# Patient Record
Sex: FEMALE | Race: WHITE | Employment: FULL TIME | URBAN - METROPOLITAN AREA
[De-identification: names, ages, dates, MRNs, and addresses within clinical notes are randomized per-mention and may not be internally consistent; named-entity substitution may affect disease eponyms.]

---

## 2017-01-09 ENCOUNTER — PATIENT MESSAGE (OUTPATIENT)
Dept: FAMILY MEDICINE CLINIC | Facility: CLINIC | Age: 42
End: 2017-01-09

## 2017-01-12 NOTE — TELEPHONE ENCOUNTER
From: Renato Cardenas  To: Dinesh Lopez DO  Sent: 1/9/2017 8:54 AM CST  Subject: Non-Urgent Medical Question    Hello,  I received a message about my flu shot. I have received a flu shot from my place of employment on 11/7/2016.     Sanjeev Holman So

## 2017-02-14 RX ORDER — VERAPAMIL HYDROCHLORIDE 240 MG/1
TABLET, FILM COATED, EXTENDED RELEASE ORAL
Qty: 90 TABLET | Refills: 0 | Status: SHIPPED | OUTPATIENT
Start: 2017-02-14 | End: 2017-05-13

## 2017-02-15 ENCOUNTER — TELEPHONE (OUTPATIENT)
Dept: OBGYN CLINIC | Facility: CLINIC | Age: 42
End: 2017-02-15

## 2017-02-15 DIAGNOSIS — Z12.31 SCREENING MAMMOGRAM, ENCOUNTER FOR: Primary | ICD-10-CM

## 2017-02-16 NOTE — TELEPHONE ENCOUNTER
Pt informed of recs below and informed that order for mammo has been placed. Pt given number for central scheduling to call and set up appt. Pt instructed to call office back to [address] [place] [address].  PAAs--when pt calls back, plea

## 2017-02-16 NOTE — TELEPHONE ENCOUNTER
Why is patient on Nova Montoya's schedule for annual when she is my long term patient? Give mammogram order but put her on my schedule -- I had tons of openings recently.  Forward to Cordell to assist.

## 2017-02-16 NOTE — TELEPHONE ENCOUNTER
Pt last Annual 11/2015. Addl mamm views done 1/28/16. Pt had her post op 6/2/16, pt states she had Hysterectomy May 2016.   Pt states that she will schedule her Annual, but wants to know if [facility] can order a mammogram before her Annual.  (See MyChart from 2

## 2017-02-27 ENCOUNTER — OFFICE VISIT (OUTPATIENT)
Dept: FAMILY MEDICINE CLINIC | Facility: CLINIC | Age: 42
End: 2017-02-27

## 2017-02-27 ENCOUNTER — TELEPHONE (OUTPATIENT)
Dept: FAMILY MEDICINE CLINIC | Facility: CLINIC | Age: 42
End: 2017-02-27

## 2017-02-27 VITALS
TEMPERATURE: 98 F | HEART RATE: 85 BPM | DIASTOLIC BLOOD PRESSURE: 84 MMHG | SYSTOLIC BLOOD PRESSURE: 125 MMHG | BODY MASS INDEX: 28 KG/M2 | WEIGHT: 161 LBS

## 2017-02-27 DIAGNOSIS — J01.01 ACUTE RECURRENT MAXILLARY SINUSITIS: ICD-10-CM

## 2017-02-27 DIAGNOSIS — I10 ESSENTIAL HYPERTENSION: ICD-10-CM

## 2017-02-27 DIAGNOSIS — I67.1 BRAIN ANEURYSM: Primary | ICD-10-CM

## 2017-02-27 PROCEDURE — 99212 OFFICE O/P EST SF 10 MIN: CPT | Performed by: FAMILY MEDICINE

## 2017-02-27 PROCEDURE — 99214 OFFICE O/P EST MOD 30 MIN: CPT | Performed by: FAMILY MEDICINE

## 2017-02-27 RX ORDER — AZITHROMYCIN 250 MG/1
TABLET, FILM COATED ORAL
Qty: 6 TABLET | Refills: 0 | Status: SHIPPED | OUTPATIENT
Start: 2017-02-27 | End: 2017-02-28

## 2017-02-27 RX ORDER — VERAPAMIL HYDROCHLORIDE 240 MG/1
240 TABLET, FILM COATED, EXTENDED RELEASE ORAL
Qty: 90 TABLET | Refills: 2 | Status: SHIPPED | OUTPATIENT
Start: 2017-02-27 | End: 2017-12-15

## 2017-02-27 RX ORDER — ALPRAZOLAM 0.5 MG/1
TABLET ORAL
Qty: 30 TABLET | Refills: 6 | Status: SHIPPED
Start: 2017-02-27 | End: 2017-09-13

## 2017-02-27 NOTE — PROGRESS NOTES
HPI:    Patient ID: Laura Williamson is a 36year old female. HPI  Patient presents with:  Hypertension: 3 month f/u  Orders Call: pt will need CTA Brain order      Review of Systems   Constitutional: Negative.     HENT: Positive for congestion and rhin infection at this time. Follow-up brain CT 8 to compared to prior any worsening condition will be referred to neurosurgery otherwise no new symptoms at this time  No orders of the defined types were placed in this encounter.        Meds This Visit:  Signed

## 2017-02-27 NOTE — TELEPHONE ENCOUNTER
Pt said pharmacy did not receive azithromycin 250 MG Oral Tab  Sent to mail order in error  Requesting to resend to [facility]/ [address] [place] [address] today

## 2017-02-28 RX ORDER — AZITHROMYCIN 250 MG/1
TABLET, FILM COATED ORAL
Qty: 6 TABLET | Refills: 0 | Status: SHIPPED | OUTPATIENT
Start: 2017-02-28 | End: 2017-07-13 | Stop reason: ALTCHOICE

## 2017-02-28 NOTE — TELEPHONE ENCOUNTER
From: Kash Feng  To:  Daisy Gonzalez DO  Sent: 2/28/2017 8:31 AM CST  Subject: Medication Renewal Request    Original authorizing provider: DO Kash Reese would like a refill of the following medications:  azithromycin 2

## 2017-03-08 ENCOUNTER — HOSPITAL ENCOUNTER (OUTPATIENT)
Dept: CT IMAGING | Facility: HOSPITAL | Age: 42
Discharge: HOME | End: 2017-03-08
Attending: FAMILY MEDICINE
Payer: COMMERCIAL

## 2017-03-08 DIAGNOSIS — I67.1 BRAIN ANEURYSM: ICD-10-CM

## 2017-03-08 LAB — CREAT BLD-MCNC: 0.9 MG/DL (ref 0.5–1.5)

## 2017-03-08 PROCEDURE — 70496 CT ANGIOGRAPHY HEAD: CPT

## 2017-03-08 PROCEDURE — 82565 ASSAY OF CREATININE: CPT

## 2017-03-25 RX ORDER — QUINAPRIL 20 MG/1
TABLET ORAL
Qty: 90 TABLET | Refills: 2 | Status: SHIPPED | OUTPATIENT
Start: 2017-03-25 | End: 2017-12-15

## 2017-05-15 RX ORDER — VERAPAMIL HYDROCHLORIDE 240 MG/1
TABLET, FILM COATED, EXTENDED RELEASE ORAL
Qty: 90 TABLET | Refills: 1 | Status: SHIPPED | OUTPATIENT
Start: 2017-05-15 | End: 2017-10-10

## 2017-07-13 NOTE — PROGRESS NOTES
HPI:    Patient ID: Taylor Thomas is a 36year old female. HPI  Patient presents with: Anxiety    Review of Systems   Psychiatric/Behavioral: Negative for sleep disturbance and suicidal ideas. The patient is nervous/anxious.              Current Out mouth daily as needed for Sleep.            Imaging & Referrals:  None       AP#[id number]

## 2017-09-01 RX ORDER — CLINDAMYCIN PHOSPHATE 10 MG/ML
LOTION TOPICAL
Qty: 60 ML | Refills: 0 | Status: SHIPPED | OUTPATIENT
Start: 2017-09-01 | End: 2017-12-15

## 2017-09-01 NOTE — TELEPHONE ENCOUNTER
Last refilled on 10-4-16    Refill Protocol Appointment Criteria  · Appointment scheduled in the past 6 months or in the next 3 months  Recent Outpatient Visits            1 month [facility]

## 2017-09-14 ENCOUNTER — HOSPITAL ENCOUNTER (OUTPATIENT)
Dept: MAMMOGRAPHY | Age: 42
Discharge: HOME | End: 2017-09-14
Attending: OBSTETRICS & GYNECOLOGY
Payer: COMMERCIAL

## 2017-09-14 DIAGNOSIS — Z12.31 SCREENING MAMMOGRAM, ENCOUNTER FOR: ICD-10-CM

## 2017-09-14 PROCEDURE — 77067 SCR MAMMO BI INCL CAD: CPT | Performed by: OBSTETRICS & GYNECOLOGY

## 2017-09-14 NOTE — TELEPHONE ENCOUNTER
From: Vicente Braga  Sent: 9/13/2017 4:50 PM CDT  Subject: Medication Renewal Request    Vicente Braga would like a refill of the following medications:     alprazolam 0.5 MG Oral Tab Nai Raymond DO]    [facility]

## 2017-09-14 NOTE — TELEPHONE ENCOUNTER
OV: 7/13/17 Last Rx: 7/13/17    Please advise in regards to refill request. Thank You    Please have roomer phone in if approved

## 2017-09-15 RX ORDER — ALPRAZOLAM 0.5 MG/1
TABLET ORAL
Qty: 30 TABLET | Refills: 6 | OUTPATIENT
Start: 2017-09-15 | End: 2017-12-15

## 2017-10-11 ENCOUNTER — PATIENT MESSAGE (OUTPATIENT)
Dept: FAMILY MEDICINE CLINIC | Facility: CLINIC | Age: 42
End: 2017-10-11

## 2017-10-11 NOTE — TELEPHONE ENCOUNTER
Dr Renata Maki,    See pts' mychart message. Pt needs refill on rizatriptan 10mg (maxalt)  for her migraine headaches which she gets twice a week. The medication for pended for md review and signoff if appropriate.

## 2017-10-11 NOTE — TELEPHONE ENCOUNTER
From: Sheldon Goldmann  To: Jessee Joiner,   Sent: 10/11/2017 9:41 AM CDT  Subject: Prescription Question    Hello,   Can you please put through a prescription for my migraine medicine?  I did not see it on the prescription page and am completely out

## 2017-10-12 RX ORDER — VERAPAMIL HYDROCHLORIDE 240 MG/1
TABLET, FILM COATED, EXTENDED RELEASE ORAL
Qty: 90 TABLET | Refills: 0 | Status: SHIPPED | OUTPATIENT
Start: 2017-10-12 | End: 2018-04-16

## 2017-10-12 RX ORDER — RIZATRIPTAN BENZOATE 10 MG/1
TABLET, ORALLY DISINTEGRATING ORAL
Qty: 9 TABLET | Refills: 3 | Status: SHIPPED | OUTPATIENT
Start: 2017-10-12 | End: 2018-12-04

## 2017-10-12 NOTE — TELEPHONE ENCOUNTER
Hypertensive Medications  Protocol Criteria:  · Appointment scheduled in the past 6 months or in the next 3 months  · BMP or CMP in the past 12 months  · Creatinine result < 2  Recent Outpatient Visits            3 months ago [facility]

## 2017-10-23 ENCOUNTER — OFFICE VISIT (OUTPATIENT)
Dept: OBGYN CLINIC | Facility: CLINIC | Age: 42
End: 2017-10-23

## 2017-10-23 VITALS
HEIGHT: 64.25 IN | BODY MASS INDEX: 26.98 KG/M2 | WEIGHT: 158 LBS | SYSTOLIC BLOOD PRESSURE: 132 MMHG | DIASTOLIC BLOOD PRESSURE: 88 MMHG | HEART RATE: 66 BPM

## 2017-10-23 DIAGNOSIS — Z01.419 ENCOUNTER FOR GYNECOLOGICAL EXAMINATION WITHOUT ABNORMAL FINDING: Primary | ICD-10-CM

## 2017-10-23 PROCEDURE — 99396 PREV VISIT EST AGE 40-64: CPT | Performed by: OBSTETRICS & GYNECOLOGY

## 2017-10-23 NOTE — PROGRESS NOTES
Nuzhat Epps is a 36year old female [id number] Patient's last menstrual period was 02/14/2016 (exact date). Patient presents with:  Gyn Exam: annual -- doing well. Hx TLH for failed ablation. No hot flashes. Recent wedding w/ xs bruises from falling. [place] file     Social History Main Topics   Smoking status: Never Smoker    Smokeless tobacco: Not on file    Alcohol use Yes  0.0 oz/week     Comment: beer & wine, rarely    Drug use: No    Sexual activity: Not on file     Other Topics Concern    Caffeine Radha itching  Musculoskeletal:  denies back pain. Skin/Breast:  Denies any breast pain, lumps, or discharge. Neurological:  denies headaches, extremity weakness or numbness. Psychiatric: denies depression or anxiety.   Endocrine:   denies excessive thirst or

## 2017-10-26 ENCOUNTER — OFFICE VISIT (OUTPATIENT)
Dept: FAMILY MEDICINE CLINIC | Facility: CLINIC | Age: 42
End: 2017-10-26

## 2017-10-26 VITALS
WEIGHT: 158 LBS | BODY MASS INDEX: 27 KG/M2 | HEART RATE: 68 BPM | DIASTOLIC BLOOD PRESSURE: 83 MMHG | SYSTOLIC BLOOD PRESSURE: 129 MMHG

## 2017-10-26 DIAGNOSIS — M54.5 ACUTE BILATERAL LOW BACK PAIN, WITH SCIATICA PRESENCE UNSPECIFIED: Primary | ICD-10-CM

## 2017-10-26 PROBLEM — L81.8 DECORATIVE TATTOO: Status: RESOLVED | Noted: 2017-10-26 | Resolved: 2017-10-26

## 2017-10-26 PROCEDURE — 98925 OSTEOPATH MANJ 1-2 REGIONS: CPT | Performed by: FAMILY MEDICINE

## 2017-10-26 PROCEDURE — 99212 OFFICE O/P EST SF 10 MIN: CPT | Performed by: FAMILY MEDICINE

## 2017-10-26 PROCEDURE — 99213 OFFICE O/P EST LOW 20 MIN: CPT | Performed by: FAMILY MEDICINE

## 2017-10-26 RX ORDER — CYCLOBENZAPRINE HCL 10 MG
10 TABLET ORAL 3 TIMES DAILY PRN
Qty: 20 TABLET | Refills: 1 | Status: SHIPPED | OUTPATIENT
Start: 2017-10-26 | End: 2017-11-15

## 2017-10-26 RX ORDER — NAPROXEN 500 MG/1
500 TABLET ORAL 2 TIMES DAILY WITH MEALS
Qty: 20 TABLET | Refills: 0 | Status: SHIPPED | OUTPATIENT
Start: 2017-10-26 | End: 2018-05-24 | Stop reason: ALTCHOICE

## 2017-10-26 NOTE — PROGRESS NOTES
HPI:    Patient ID: Gil Phillips is a 36year old female. HPI  Patient presents with:  Back Pain: pt is having back spasms    Review of Systems   Constitutional: Negative. Genitourinary: Negative. Musculoskeletal: Positive for back pain. diagnosis)    Treatment today, meds, HEP. Follow up as needed. No orders of the defined types were placed in this encounter.       Meds This Visit:  Signed Prescriptions Disp Refills    Cyclobenzaprine HCl 10 MG Oral Tab 20 tablet 1      Sig: Take 1 table

## 2017-10-26 NOTE — PROGRESS NOTES
HPI:    Patient ID: Mattie Gannon is a 36year old female. HPI    Review of Systems         Current Outpatient Prescriptions:  Cyclobenzaprine HCl 10 MG Oral Tab Take 1 tablet (10 mg total) by mouth 3 (three) times daily as needed for Muscle spasms.

## 2017-10-26 NOTE — H&P
[facility] CLINIC, [facility]    History and Physical    Kash Feng Patient Status:  Outpatient    1975 MRN [id number]   Location [address] [facility] Attending No att. providers found   Hosp Day # 0 PCP Ma pressure 129/83, pulse 68, weight 158 lb (71.7 kg), last menstrual period 02/14/2016, unknown if currently breastfeeding.   Physical Exam  Cervical Papanicolaou {Cervical Pap:5397}    Results:     Lab Results  Component Value Date   WBC 6.8 12/14/2016   HGB

## 2017-11-28 ENCOUNTER — TELEPHONE (OUTPATIENT)
Dept: FAMILY MEDICINE CLINIC | Facility: CLINIC | Age: 42
End: 2017-11-28

## 2017-11-28 DIAGNOSIS — Z00.00 ROUTINE GENERAL MEDICAL EXAMINATION AT A HEALTH CARE FACILITY: Primary | ICD-10-CM

## 2017-12-08 ENCOUNTER — LAB ENCOUNTER (OUTPATIENT)
Dept: LAB | Age: 42
End: 2017-12-08
Attending: FAMILY MEDICINE
Payer: COMMERCIAL

## 2017-12-08 DIAGNOSIS — Z00.00 ROUTINE GENERAL MEDICAL EXAMINATION AT A HEALTH CARE FACILITY: ICD-10-CM

## 2017-12-08 PROCEDURE — 36415 COLL VENOUS BLD VENIPUNCTURE: CPT

## 2017-12-08 PROCEDURE — 80053 COMPREHEN METABOLIC PANEL: CPT

## 2017-12-08 PROCEDURE — 85025 COMPLETE CBC W/AUTO DIFF WBC: CPT

## 2017-12-08 PROCEDURE — 80061 LIPID PANEL: CPT

## 2017-12-08 RX ORDER — ALPRAZOLAM 0.5 MG/1
TABLET ORAL
Qty: 30 TABLET | Refills: 6 | Status: CANCELLED
Start: 2017-12-08

## 2017-12-15 ENCOUNTER — OFFICE VISIT (OUTPATIENT)
Dept: FAMILY MEDICINE CLINIC | Facility: CLINIC | Age: 42
End: 2017-12-15

## 2017-12-15 VITALS
BODY MASS INDEX: 26.8 KG/M2 | DIASTOLIC BLOOD PRESSURE: 79 MMHG | TEMPERATURE: 98 F | SYSTOLIC BLOOD PRESSURE: 124 MMHG | WEIGHT: 157 LBS | HEART RATE: 76 BPM | HEIGHT: 64 IN

## 2017-12-15 DIAGNOSIS — F41.9 ANXIETY: ICD-10-CM

## 2017-12-15 DIAGNOSIS — I10 ESSENTIAL HYPERTENSION: ICD-10-CM

## 2017-12-15 DIAGNOSIS — Z00.00 ROUTINE GENERAL MEDICAL EXAMINATION AT A HEALTH CARE FACILITY: Primary | ICD-10-CM

## 2017-12-15 PROCEDURE — 99396 PREV VISIT EST AGE 40-64: CPT | Performed by: FAMILY MEDICINE

## 2017-12-15 RX ORDER — QUINAPRIL 20 MG/1
20 TABLET ORAL
Qty: 90 TABLET | Refills: 3 | Status: SHIPPED | OUTPATIENT
Start: 2017-12-15 | End: 2018-12-04

## 2017-12-15 RX ORDER — CLINDAMYCIN PHOSPHATE 10 MG/ML
LOTION TOPICAL
Qty: 60 ML | Refills: 3 | Status: SHIPPED | OUTPATIENT
Start: 2017-12-15 | End: 2018-06-04 | Stop reason: ALTCHOICE

## 2017-12-15 RX ORDER — ALPRAZOLAM 0.5 MG/1
TABLET ORAL
Qty: 30 TABLET | Refills: 6 | Status: SHIPPED
Start: 2017-12-15 | End: 2018-06-25

## 2017-12-15 RX ORDER — VERAPAMIL HYDROCHLORIDE 240 MG/1
240 TABLET, FILM COATED, EXTENDED RELEASE ORAL
Qty: 90 TABLET | Refills: 3 | Status: SHIPPED | OUTPATIENT
Start: 2017-12-15 | End: 2018-12-04

## 2017-12-15 NOTE — PROGRESS NOTES
HPI:    Patient ID: Waleska Gonzalez is a 36year old female.     HPI  Patient presents with:  Routine Physical    Past Medical History:   Diagnosis Date   • Abnormal uterine bleeding 07/01/2015   • Anxiety 01/01/2012   • Blood transfusion reaction 08/17/1 mouth daily as needed for Sleep. Disp: 30 tablet Rfl: 1   triamcinolone acetonide 0.1 % External Cream Apply topically 2 (two) times daily as needed.  Disp: 30 g Rfl: 1     Allergies:No Known Allergies   PHYSICAL EXAM:   Physical Exam   Constitutional: She Disp Refills    Quinapril HCl 20 MG Oral Tab 90 tablet 3      Sig: Take 1 tablet (20 mg total) by mouth once daily.       ALPRAZolam 0.5 MG Oral Tab 30 tablet 6      Sig: TAKE 1 TABLET BY MOUTH TWICE DAILY AS NEEDED      Verapamil HCl  MG Oral Tab CR

## 2018-03-09 ENCOUNTER — PATIENT MESSAGE (OUTPATIENT)
Dept: FAMILY MEDICINE CLINIC | Facility: CLINIC | Age: 43
End: 2018-03-09

## 2018-03-09 DIAGNOSIS — I67.1 BRAIN ANEURYSM: Primary | ICD-10-CM

## 2018-03-09 NOTE — TELEPHONE ENCOUNTER
MJS please advise on below: order pended - need dx     From: Mattie Gannon  To: Hung Frye, DO  Sent: 3/9/2018 12:22 PM CST  Subject: Other    Hello,  I have to have my yearly CT scan done.   Can this be ordered or do I have to come in for an offi

## 2018-03-19 ENCOUNTER — TELEPHONE (OUTPATIENT)
Dept: FAMILY MEDICINE CLINIC | Facility: CLINIC | Age: 43
End: 2018-03-19

## 2018-03-19 NOTE — TELEPHONE ENCOUNTER
Mary Aguayo from [facility] contacted them to request that she have her CTA that was ordered by Dr Andre Kendall processed at [facility] instead of with Janell Medel requesting CTA order faxed to [phone].     Called patient - verified patient's

## 2018-04-04 ENCOUNTER — TELEPHONE (OUTPATIENT)
Dept: FAMILY MEDICINE CLINIC | Facility: CLINIC | Age: 43
End: 2018-04-04

## 2018-04-04 NOTE — TELEPHONE ENCOUNTER
See 3/13 order    Aleksandra/ [facility] requesting confirmation on order for CTA of  brain      Said Dx aneurysm head and neck     Questioning if needs CTA  neck as well,  or just brain?   If to include neck will need authorized    Pt has 4/9 appt scheduled

## 2018-04-24 ENCOUNTER — TELEPHONE (OUTPATIENT)
Dept: FAMILY MEDICINE CLINIC | Facility: CLINIC | Age: 43
End: 2018-04-24

## 2018-04-24 DIAGNOSIS — I67.1 BRAIN ANEURYSM: Primary | ICD-10-CM

## 2018-04-24 RX ORDER — VERAPAMIL HYDROCHLORIDE 240 MG/1
TABLET, FILM COATED, EXTENDED RELEASE ORAL
Qty: 90 TABLET | Refills: 0 | Status: SHIPPED | OUTPATIENT
Start: 2018-04-24 | End: 2018-05-24

## 2018-04-24 NOTE — TELEPHONE ENCOUNTER
Pt called and states Dr was suppose to call her yesterday to give her a Dr for a neuro surgeon   Pt called to f/u

## 2018-04-24 NOTE — TELEPHONE ENCOUNTER
The scan was done outside of [address] [place] [address] and reviewed with her. See referral and inform of name and office phone.

## 2018-04-24 NOTE — TELEPHONE ENCOUNTER
LM to notify pt in regards to referral ready for pick at  with neurosurgeon information address and number.

## 2018-05-09 ENCOUNTER — TELEPHONE (OUTPATIENT)
Dept: FAMILY MEDICINE CLINIC | Facility: CLINIC | Age: 43
End: 2018-05-09

## 2018-05-09 NOTE — TELEPHONE ENCOUNTER
CT brain from 4/9/18 and 3/8/17 sent to Dr Heidi Adames by fax at [phone] at 2:50 pm today 5/9/18.  Confirmed with patient which CT to send first.

## 2018-05-09 NOTE — TELEPHONE ENCOUNTER
Pt states need CT results faxed [phone] to Dr. Karen Irizarry office before your appointment on 5/24/2018.

## 2018-05-24 ENCOUNTER — OFFICE VISIT (OUTPATIENT)
Dept: SURGERY | Facility: CLINIC | Age: 43
End: 2018-05-24

## 2018-05-24 ENCOUNTER — TELEPHONE (OUTPATIENT)
Dept: SURGERY | Facility: CLINIC | Age: 43
End: 2018-05-24

## 2018-05-24 VITALS
HEIGHT: 64 IN | RESPIRATION RATE: 16 BRPM | WEIGHT: 155 LBS | SYSTOLIC BLOOD PRESSURE: 134 MMHG | BODY MASS INDEX: 26.46 KG/M2 | DIASTOLIC BLOOD PRESSURE: 94 MMHG | HEART RATE: 66 BPM

## 2018-05-24 DIAGNOSIS — I67.1 BRAIN ANEURYSM: Primary | ICD-10-CM

## 2018-05-24 PROCEDURE — 99243 OFF/OP CNSLTJ NEW/EST LOW 30: CPT | Performed by: PHYSICIAN ASSISTANT

## 2018-05-24 NOTE — H&P
Neurosurgery Clinic Visit  2018    Stew Li PCP:  DO TANJA Sorensen 1975 MRN [id number]       CHIEF COMPLAINT:  Patient presents with:  New Patient      HISTORY OF PRESENT ILLNESS:  Stew Li is a(n) 39year old female wh 01/01/2012   • Blood transfusion reaction 08/17/1999   • Brain aneurysm 1999    neuro clipping   • Decorative tattoo    • Dysmenorrhea 01/01/2014   • HYPERTENSION    • Hypertension 01/01/1999   • Lipid screening 9/14/2011    per NG   • Menometrorrhagia genital:  Exams are deferred. Muscular:  Exam reveals normal bulk and tone. Neurologic Exam: The patient is oriented to time, person, place, and situation.   Memory, attention span, language findings, and knowledge and insight into the problem appear Guinea aneurysm clipping with Dr. Kaushik Ortiz at [facility] in [address] [place] [address]. Reviewed imaging with the patient and her . Will request records from [facility].  She will drop off imaging from this April 2018.   We will discuss with Dr. Reinier Holt

## 2018-05-24 NOTE — PROGRESS NOTES
Patient is here today for an aneurysm. Patient had an aneurysm when she was 24yrs old and had a clip placed. Patient a CTA done in April 2018 shows another aneurysm. Patient has headaches.  Patient has twitching and numbness on the right side of her face th

## 2018-05-24 NOTE — TELEPHONE ENCOUNTER
Pt signed DMITRI during [address], faxed to [facility] @ [phone], confirmation received; original sent to scanning.

## 2018-05-29 ENCOUNTER — TELEPHONE (OUTPATIENT)
Dept: SURGERY | Facility: CLINIC | Age: 43
End: 2018-05-29

## 2018-05-29 NOTE — TELEPHONE ENCOUNTER
Called pt to review recommendations from CV conference and neuro IR. She needs to set up angiogram with neuro IR. She may need appointment with them first since she has never seen any of our neuro IR physicians. Please contact her to set this up.   Thank

## 2018-05-30 ENCOUNTER — MED REC SCAN ONLY (OUTPATIENT)
Dept: SURGERY | Facility: CLINIC | Age: 43
End: 2018-05-30

## 2018-06-04 ENCOUNTER — OFFICE VISIT (OUTPATIENT)
Dept: SURGERY | Facility: CLINIC | Age: 43
End: 2018-06-04

## 2018-06-04 VITALS — DIASTOLIC BLOOD PRESSURE: 85 MMHG | HEART RATE: 82 BPM | SYSTOLIC BLOOD PRESSURE: 130 MMHG

## 2018-06-04 DIAGNOSIS — I67.1 BRAIN ANEURYSM: Primary | ICD-10-CM

## 2018-06-04 PROCEDURE — 99203 OFFICE O/P NEW LOW 30 MIN: CPT | Performed by: RADIOLOGY

## 2018-06-04 NOTE — PROGRESS NOTES
Patient is here for evaluation for aneurysm. Review of Systems:    Hand Dominance: right  General: no symptoms reported  Neuro: Right facial numbness with twitching over past 6 months.    Head: headache Light headiness   Musculoskeletal: no symptoms repor

## 2018-06-04 NOTE — PROGRESS NOTES
6/4/2018      Dear Karina Toney and Jarrell,  I had the pleasure of seeing your patient, Álvaro Gagnon today,6/4/2018   .   SUBJECTIVE     Chief Complaint: Cj Mccurdy is a 39year old  female here today for consultation for past history of rup Menorrhagia     embx neg, cervical polyp 2013   • Migraines    • Pinched nerve     in back   • Vulval hematoma      x 2 (vulva hematoma x 1)      Past Surgical History:  : ENDOMETRIAL ABLATION      Comment: HTA  2016: HYSTERECTOMY      Comment Comment: beer & wine, rarely        PHYSICAL EXAM   /85   Pulse 82   LMP 02/14/2016   General appearance: 26-year-old  woman, in the office with her .   She is mild to moderately overweight, appearing her stated age, in no acute dist artifact obscuring the adjacent structures. 2. 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      3. Stable vascular infundibulum involving the junction of the left-sided A1 and A2 seg can significantly improve the images. 3. Given the fact that she lives in [address] [place] [address], we can perform her angiogram at [facility] which is very close to where she lives. No Follow-up on file.

## 2018-06-04 NOTE — PATIENT INSTRUCTIONS
Refill policies:    • Allow 2-3 business days for refills; controlled substances may take longer.   • Contact your pharmacy at least 5 days prior to running out of medication and have them send an electronic request or submit request through the “request re entire amount billed. Precertification and Prior Authorizations: If your physician has recommended that you have a procedure or additional testing performed.    FOR BEHAVIORAL HEALTH) will contact your insurance carrier to obtain pre-certi

## 2018-06-25 NOTE — TELEPHONE ENCOUNTER
From: Nuzhat Epps  Sent: 6/25/2018 2:34 PM CDT  Subject: Medication Renewal Request    uNzhat Epps would like a refill of the following medications:     ALPRAZolam 0.5 MG Oral Tab Alonzo Burgess DO]    Preferred pharmacy: [address] [place] [address]

## 2018-06-26 NOTE — TELEPHONE ENCOUNTER
Please advise on refill request. Last script 12/15/17    Please respond to pool: TAMMIE Gudino 61 LPN/CMA    Refill Protocol Appointment Criteria  · Appointment scheduled in the past 6 months or in the next 3 months  Recent Outpatient Visits            3 weeks ag

## 2018-06-29 RX ORDER — ALPRAZOLAM 0.5 MG/1
TABLET ORAL
Qty: 30 TABLET | Refills: 6 | OUTPATIENT
Start: 2018-06-29 | End: 2018-12-04

## 2018-06-30 RX ORDER — ALPRAZOLAM 0.5 MG/1
TABLET ORAL
Qty: 30 TABLET | Refills: 0 | OUTPATIENT
Start: 2018-06-30

## 2018-10-15 ENCOUNTER — NURSE TRIAGE (OUTPATIENT)
Dept: OTHER | Age: 43
End: 2018-10-15

## 2018-10-15 ENCOUNTER — OFFICE VISIT (OUTPATIENT)
Dept: INTERNAL MEDICINE CLINIC | Facility: CLINIC | Age: 43
End: 2018-10-15
Payer: COMMERCIAL

## 2018-10-15 ENCOUNTER — HOSPITAL ENCOUNTER (OUTPATIENT)
Age: 43
Discharge: HOME | End: 2018-10-15
Payer: COMMERCIAL

## 2018-10-15 ENCOUNTER — HOSPITAL ENCOUNTER (OUTPATIENT)
Dept: CT IMAGING | Facility: HOSPITAL | Age: 43
Discharge: HOME | End: 2018-10-15
Attending: INTERNAL MEDICINE
Payer: COMMERCIAL

## 2018-10-15 VITALS
WEIGHT: 155 LBS | SYSTOLIC BLOOD PRESSURE: 135 MMHG | RESPIRATION RATE: 18 BRPM | TEMPERATURE: 99 F | HEART RATE: 80 BPM | OXYGEN SATURATION: 97 % | BODY MASS INDEX: 26.46 KG/M2 | DIASTOLIC BLOOD PRESSURE: 90 MMHG | HEIGHT: 64 IN

## 2018-10-15 VITALS
WEIGHT: 158.19 LBS | SYSTOLIC BLOOD PRESSURE: 155 MMHG | BODY MASS INDEX: 27.01 KG/M2 | DIASTOLIC BLOOD PRESSURE: 100 MMHG | HEART RATE: 76 BPM | TEMPERATURE: 99 F | HEIGHT: 64 IN

## 2018-10-15 DIAGNOSIS — R10.12 LUQ PAIN: Primary | ICD-10-CM

## 2018-10-15 DIAGNOSIS — K57.92 ACUTE DIVERTICULITIS: ICD-10-CM

## 2018-10-15 DIAGNOSIS — R10.32 ABDOMINAL PAIN, LEFT LOWER QUADRANT: Primary | ICD-10-CM

## 2018-10-15 DIAGNOSIS — I10 ESSENTIAL HYPERTENSION: ICD-10-CM

## 2018-10-15 DIAGNOSIS — R10.12 LUQ PAIN: ICD-10-CM

## 2018-10-15 PROCEDURE — 85025 COMPLETE CBC W/AUTO DIFF WBC: CPT

## 2018-10-15 PROCEDURE — 99212 OFFICE O/P EST SF 10 MIN: CPT

## 2018-10-15 PROCEDURE — 36415 COLL VENOUS BLD VENIPUNCTURE: CPT

## 2018-10-15 PROCEDURE — 81025 URINE PREGNANCY TEST: CPT

## 2018-10-15 PROCEDURE — 99214 OFFICE O/P EST MOD 30 MIN: CPT | Performed by: INTERNAL MEDICINE

## 2018-10-15 PROCEDURE — 99212 OFFICE O/P EST SF 10 MIN: CPT | Performed by: INTERNAL MEDICINE

## 2018-10-15 PROCEDURE — 82565 ASSAY OF CREATININE: CPT

## 2018-10-15 PROCEDURE — 80053 COMPREHEN METABOLIC PANEL: CPT

## 2018-10-15 PROCEDURE — 74177 CT ABD & PELVIS W/CONTRAST: CPT | Performed by: INTERNAL MEDICINE

## 2018-10-15 PROCEDURE — 99203 OFFICE O/P NEW LOW 30 MIN: CPT

## 2018-10-15 PROCEDURE — 81003 URINALYSIS AUTO W/O SCOPE: CPT

## 2018-10-15 NOTE — ED PROVIDER NOTES
Patient presents with:  Abdomen/Flank Pain (GI/)      HPI:     Diamond Beltran is a 39year old female with a past history of hypertension, migraine, brain aneurysm presents with left sided abdominal pain since Wednesday.   Patient denies any nausea, vomiting or Urine      POCT Urine Dip      Labs performed this visit:  No results found for this or any previous visit (from the past 10 hour(s)). Diagnosis:    ICD-10-CM    1.  Abdominal pain, left lower quadrant R10.32        All results reviewed and discussed wi

## 2018-10-15 NOTE — ED INITIAL ASSESSMENT (HPI)
Constant left flank area pain since Wednesday no nausea no vomiting no dysuria no diverticulitis.  Pain alters in strenght but doesn't go away

## 2018-10-15 NOTE — TELEPHONE ENCOUNTER
Action Requested: Summary for Provider     []  Critical Lab, Recommendations Needed  [] Need Additional Advice  []   FYI    []   Need Orders  [] Need Medications Sent to Pharmacy  []  Other     SUMMARY: appt to see Dr Vladislav Crowe today  PCP no access    Pt c

## 2018-10-15 NOTE — PROGRESS NOTES
Nuzhat Epps is a 39year old female.   Patient presents with:  Abdominal Pain: LUQ seen at Urgent Care      HPI:   Pt urgent visit-- here with her    C/c left upper quadrant pain  c/o belly pain   LQU pain x 5 days --constant -   Constipated -- Past Surgical History:   Procedure Laterality Date   • Endometrial ablation      HTA   • Hysterectomy  2016    TLH   • Hysteroscopy  2010    essure placement   • Leep     •        x 2 (vulva hematoma x 1)   • Other surgical history sodium, low salt     Acute diverticulitis  -     CBC WITH DIFFERENTIAL WITH PLATELET; Future   -     COMP METABOLIC PANEL (14);  Future    we will check labs and CT scan, advsied to call     Essential hypertension   ML From pain, advised patient to come leonidas

## 2018-10-17 ENCOUNTER — TELEPHONE (OUTPATIENT)
Dept: INTERNAL MEDICINE CLINIC | Facility: CLINIC | Age: 43
End: 2018-10-17

## 2018-10-17 ENCOUNTER — OFFICE VISIT (OUTPATIENT)
Dept: OBGYN CLINIC | Facility: CLINIC | Age: 43
End: 2018-10-17

## 2018-10-17 VITALS
DIASTOLIC BLOOD PRESSURE: 101 MMHG | WEIGHT: 159 LBS | SYSTOLIC BLOOD PRESSURE: 155 MMHG | HEART RATE: 64 BPM | BODY MASS INDEX: 27 KG/M2

## 2018-10-17 DIAGNOSIS — Z01.419 ENCOUNTER FOR GYNECOLOGICAL EXAMINATION WITHOUT ABNORMAL FINDING: Primary | ICD-10-CM

## 2018-10-17 DIAGNOSIS — I10 HYPERTENSION, UNSPECIFIED TYPE: ICD-10-CM

## 2018-10-17 DIAGNOSIS — Z12.31 VISIT FOR SCREENING MAMMOGRAM: ICD-10-CM

## 2018-10-17 DIAGNOSIS — N83.202 CYST OF LEFT OVARY: ICD-10-CM

## 2018-10-17 PROCEDURE — 99396 PREV VISIT EST AGE 40-64: CPT | Performed by: OBSTETRICS & GYNECOLOGY

## 2018-10-17 PROCEDURE — 99213 OFFICE O/P EST LOW 20 MIN: CPT | Performed by: OBSTETRICS & GYNECOLOGY

## 2018-10-17 NOTE — TELEPHONE ENCOUNTER
Pt called in stating that she had er CT done with the hospital before approval from her insurance. She received a call from [facility] with approval #[id number] for 10/16. She states that [facility] stated the office needs to contact Cj Erica to alter the procedure date, so it can show approved. Please advise and call back with confirmation.

## 2018-10-19 ENCOUNTER — LAB ENCOUNTER (OUTPATIENT)
Dept: LAB | Age: 43
End: 2018-10-19
Attending: FAMILY MEDICINE
Payer: COMMERCIAL

## 2018-10-19 ENCOUNTER — OFFICE VISIT (OUTPATIENT)
Dept: FAMILY MEDICINE CLINIC | Facility: CLINIC | Age: 43
End: 2018-10-19
Payer: COMMERCIAL

## 2018-10-19 VITALS
DIASTOLIC BLOOD PRESSURE: 102 MMHG | HEART RATE: 64 BPM | WEIGHT: 158 LBS | TEMPERATURE: 98 F | BODY MASS INDEX: 27 KG/M2 | SYSTOLIC BLOOD PRESSURE: 153 MMHG

## 2018-10-19 DIAGNOSIS — R10.2 PELVIC PAIN: ICD-10-CM

## 2018-10-19 DIAGNOSIS — R10.2 PELVIC PAIN: Primary | ICD-10-CM

## 2018-10-19 PROCEDURE — 99212 OFFICE O/P EST SF 10 MIN: CPT | Performed by: FAMILY MEDICINE

## 2018-10-19 PROCEDURE — 36415 COLL VENOUS BLD VENIPUNCTURE: CPT

## 2018-10-19 PROCEDURE — 83690 ASSAY OF LIPASE: CPT

## 2018-10-19 PROCEDURE — 81002 URINALYSIS NONAUTO W/O SCOPE: CPT | Performed by: FAMILY MEDICINE

## 2018-10-19 PROCEDURE — 85025 COMPLETE CBC W/AUTO DIFF WBC: CPT

## 2018-10-19 PROCEDURE — 82150 ASSAY OF AMYLASE: CPT

## 2018-10-19 PROCEDURE — 99214 OFFICE O/P EST MOD 30 MIN: CPT | Performed by: FAMILY MEDICINE

## 2018-10-19 PROCEDURE — 80048 BASIC METABOLIC PNL TOTAL CA: CPT

## 2018-10-19 RX ORDER — NAPROXEN 500 MG/1
500 TABLET ORAL 2 TIMES DAILY WITH MEALS
Qty: 30 TABLET | Refills: 1 | Status: SHIPPED | OUTPATIENT
Start: 2018-10-19 | End: 2018-12-04

## 2018-10-19 RX ORDER — HYDROCHLOROTHIAZIDE 25 MG/1
25 TABLET ORAL DAILY
Qty: 30 TABLET | Refills: 0 | Status: SHIPPED | OUTPATIENT
Start: 2018-10-19 | End: 2018-10-25

## 2018-10-19 NOTE — PROGRESS NOTES
HPI:    Patient ID: Dayanna Jack is a 39year old female. HPI  Patient presents with:  Abdominal Pain: LT side upper abdominal pain persistant, taking tylonol, had ct scan, UTD flu   Seen by another physician and CT obtained.  Also seen by gyne and Orders Placed This Encounter      POC Urinalysis, Manual Dip without microscopy [61656]      Lipase [E]      Amylase [E]      CBC With Differential With Platelet      Basic Metabolic Panel (8) [E]      Meds This Visit:  Requested Prescriptions     Signed P

## 2018-10-22 ENCOUNTER — HOSPITAL ENCOUNTER (OUTPATIENT)
Dept: ULTRASOUND IMAGING | Facility: HOSPITAL | Age: 43
Discharge: HOME | End: 2018-10-22
Attending: OBSTETRICS & GYNECOLOGY
Payer: COMMERCIAL

## 2018-10-22 DIAGNOSIS — N83.202 CYST OF LEFT OVARY: ICD-10-CM

## 2018-10-22 PROCEDURE — 93976 VASCULAR STUDY: CPT | Performed by: OBSTETRICS & GYNECOLOGY

## 2018-10-22 PROCEDURE — 76830 TRANSVAGINAL US NON-OB: CPT | Performed by: OBSTETRICS & GYNECOLOGY

## 2018-10-22 PROCEDURE — 76856 US EXAM PELVIC COMPLETE: CPT | Performed by: OBSTETRICS & GYNECOLOGY

## 2018-10-22 NOTE — PROGRESS NOTES
Axel Glaser is a 39year old female [id number] Patient's last menstrual period was 02/14/2016. Patient presents with:  Gyn Exam: Annual   Pelvic Pain: Havind left sided upper abd pain / lower abd pain x one week. No fever / chills.  Told ruptured ovarian Years of education: Not on file      Highest education level: Not on file    Social Needs      Financial resource strain: Not on file      Food insecurity - worry: Not on file      Food insecurity - inability: Not on file      Transportation needs - medica mg total) by mouth once daily. , Disp: 90 tablet, Rfl: 3  •  Rizatriptan Benzoate 10 MG Oral Tablet Dispersible, Take one tablet (10mg) by mouth as needed for migraine.  May repeat dose up to 3 times in a day., Disp: 9 tablet, Rfl: 3    ALLERGIES:  No Known discharge  Cervix:     absent  Uterus:    Absent  Adnexa:   normal without masses or tenderness  Perineum:   normal  Anus: no hemorroids     Assessment & Plan:  Isabel Yanes was seen today for gyn exam and pelvic pain.     Diagnoses and all orders for this visit

## 2018-10-25 ENCOUNTER — OFFICE VISIT (OUTPATIENT)
Dept: FAMILY MEDICINE CLINIC | Facility: CLINIC | Age: 43
End: 2018-10-25
Payer: COMMERCIAL

## 2018-10-25 VITALS
HEIGHT: 64 IN | DIASTOLIC BLOOD PRESSURE: 83 MMHG | RESPIRATION RATE: 16 BRPM | HEART RATE: 65 BPM | TEMPERATURE: 98 F | WEIGHT: 155 LBS | BODY MASS INDEX: 26.46 KG/M2 | SYSTOLIC BLOOD PRESSURE: 133 MMHG

## 2018-10-25 DIAGNOSIS — R10.2 PELVIC PAIN: Primary | ICD-10-CM

## 2018-10-25 DIAGNOSIS — R10.32 LEFT LOWER QUADRANT PAIN: ICD-10-CM

## 2018-10-25 DIAGNOSIS — M54.12 CERVICAL RADICULOPATHY: ICD-10-CM

## 2018-10-25 PROCEDURE — 99212 OFFICE O/P EST SF 10 MIN: CPT | Performed by: FAMILY MEDICINE

## 2018-10-25 PROCEDURE — 99214 OFFICE O/P EST MOD 30 MIN: CPT | Performed by: FAMILY MEDICINE

## 2018-10-25 RX ORDER — TRAMADOL HYDROCHLORIDE 50 MG/1
50 TABLET ORAL EVERY 6 HOURS PRN
Qty: 30 TABLET | Refills: 1 | Status: SHIPPED
Start: 2018-10-25 | End: 2018-12-04

## 2018-10-25 RX ORDER — HYDROCHLOROTHIAZIDE 25 MG/1
25 TABLET ORAL DAILY
Qty: 90 TABLET | Refills: 0 | Status: SHIPPED | OUTPATIENT
Start: 2018-10-25 | End: 2019-05-31 | Stop reason: ALTCHOICE

## 2018-10-25 NOTE — PROGRESS NOTES
HPI:    Patient ID: Diane Melton is a 39year old female. HPI  Patient presents with:  Pelvic Pain: 1 week follow up for left side pelvic pain. Patient states pain is the same since LOV. Pain 5/10. US completed.    Review of Systems   Constitution ten years ago. No orders of the defined types were placed in this encounter.       Meds This Visit:  Requested Prescriptions     Signed Prescriptions Disp Refills   • hydrochlorothiazide 25 MG Oral Tab 90 tablet 0     Sig: Take 1 tablet (25 mg total) by m

## 2018-10-31 NOTE — H&P
[address] [facility] [address] Gastroenterology                                                                                                  Clinic History and Physical     Pa abdominal pain when she tried to have a bowel movement. She reports feeling \"movement\" in her upper abdominal region while moving her bowels. She did not have relief following the bowel movement.   She has tried over-the-counter magnesium citrate to sti (menorrhagia)      Family Hx:   Family History   Problem Relation Age of Onset   • Cancer Father         pancreatic   • Colon Cancer Maternal Uncle    • Infectious Disease Maternal Grandmother         \"mad cow disease\"   • Breast Cancer Neg       Social HPI  GENITOURINARY:  negative for dysuria or gross hematuria  INTEGUMENT/BREAST:  SKIN:  negative for jaundice   ALLERGIC/IMMUNOLOGIC:  negative for hay fever  ENDOCRINE:  negative for cold intolerance and heat intolerance  MUSCULOSKELETAL:  negative for j pancreatitis, cholelithiasis, or choledocholithiasis. Patient utilizes NSAIDs and tramadol on a fairly regular basis due to chronic back problems which could contribute to her upper abdominal symptoms.   Possible consideration for underlying dyspepsia, gas the risks of bleeding, infection, pain, as well as the risks of anesthesia and perforation all leading to prolonged hospitalization or surgical intervention.  I also specifically mentioned the miss rate of colonoscopy of 5-10% in the best of all circumstanc

## 2018-11-07 ENCOUNTER — OFFICE VISIT (OUTPATIENT)
Dept: GASTROENTEROLOGY | Facility: CLINIC | Age: 43
End: 2018-11-07
Payer: COMMERCIAL

## 2018-11-07 ENCOUNTER — TELEPHONE (OUTPATIENT)
Dept: GASTROENTEROLOGY | Facility: CLINIC | Age: 43
End: 2018-11-07

## 2018-11-07 VITALS
HEART RATE: 72 BPM | SYSTOLIC BLOOD PRESSURE: 112 MMHG | DIASTOLIC BLOOD PRESSURE: 68 MMHG | HEIGHT: 64 IN | BODY MASS INDEX: 26.12 KG/M2 | RESPIRATION RATE: 15 BRPM | WEIGHT: 153 LBS | TEMPERATURE: 98 F

## 2018-11-07 DIAGNOSIS — K62.5 RECTAL BLEEDING: ICD-10-CM

## 2018-11-07 DIAGNOSIS — Z80.0 FAMILY HISTORY OF COLON CANCER: ICD-10-CM

## 2018-11-07 DIAGNOSIS — R10.13 EPIGASTRIC PAIN: Primary | ICD-10-CM

## 2018-11-07 DIAGNOSIS — R10.12 LEFT UPPER QUADRANT PAIN: ICD-10-CM

## 2018-11-07 DIAGNOSIS — K59.00 CONSTIPATION, UNSPECIFIED CONSTIPATION TYPE: ICD-10-CM

## 2018-11-07 DIAGNOSIS — R10.9 ABDOMINAL PAIN, UNSPECIFIED ABDOMINAL LOCATION: Primary | ICD-10-CM

## 2018-11-07 PROCEDURE — 99212 OFFICE O/P EST SF 10 MIN: CPT | Performed by: NURSE PRACTITIONER

## 2018-11-07 PROCEDURE — 99243 OFF/OP CNSLTJ NEW/EST LOW 30: CPT | Performed by: NURSE PRACTITIONER

## 2018-11-07 RX ORDER — PREDNISONE 10 MG/1
TABLET ORAL
Refills: 0 | COMMUNITY
Start: 2018-11-02 | End: 2018-12-04 | Stop reason: ALTCHOICE

## 2018-11-07 RX ORDER — MELOXICAM 15 MG/1
TABLET ORAL
Refills: 0 | COMMUNITY
Start: 2018-11-02 | End: 2019-05-31 | Stop reason: ALTCHOICE

## 2018-11-07 RX ORDER — CYCLOBENZAPRINE HCL 10 MG
TABLET ORAL
Refills: 0 | COMMUNITY
Start: 2018-11-02 | End: 2018-12-04

## 2018-11-07 NOTE — PATIENT INSTRUCTIONS
1. Schedule colonoscopy with Dr. Moriah Nelson or Dr. Gin Lee w/ MAC    2.  bowel prep from pharmacy - split dose Colyte    3. Continue all medications for procedure     4. Read all bowel prep instructions carefully    5.  AVOID seeds, nuts, popcorn, ra

## 2018-11-07 NOTE — TELEPHONE ENCOUNTER
Scheduled for: Colonoscopy 70303   Provider Name: Dr. Silvia York  Date:  1/24/19  Location:  [facility]  Sedation:  MAC  Time:  9018 (pt is aware to arrive at 0930)   Prep:  Colyte  Meds/Allergies Reconciled?:  Ainsley/FUNMILAYON reviewed   Diagnosis with codes:  Epigastric p

## 2018-11-12 ENCOUNTER — HOSPITAL ENCOUNTER (OUTPATIENT)
Dept: GENERAL RADIOLOGY | Age: 43
Discharge: HOME | End: 2018-11-12
Attending: PHYSICAL MEDICINE & REHABILITATION
Payer: COMMERCIAL

## 2018-11-12 DIAGNOSIS — M54.12 CERVICAL RADICULOPATHY: ICD-10-CM

## 2018-11-12 PROCEDURE — 72040 X-RAY EXAM NECK SPINE 2-3 VW: CPT | Performed by: PHYSICAL MEDICINE & REHABILITATION

## 2018-11-20 ENCOUNTER — TELEPHONE (OUTPATIENT)
Dept: FAMILY MEDICINE CLINIC | Facility: CLINIC | Age: 43
End: 2018-11-20

## 2018-11-29 ENCOUNTER — APPOINTMENT (OUTPATIENT)
Dept: LAB | Age: 43
End: 2018-11-29
Attending: NURSE PRACTITIONER
Payer: COMMERCIAL

## 2018-11-29 DIAGNOSIS — R10.13 EPIGASTRIC PAIN: ICD-10-CM

## 2018-11-29 PROCEDURE — 87338 HPYLORI STOOL AG IA: CPT

## 2018-12-04 ENCOUNTER — OFFICE VISIT (OUTPATIENT)
Dept: FAMILY MEDICINE CLINIC | Facility: CLINIC | Age: 43
End: 2018-12-04
Payer: COMMERCIAL

## 2018-12-04 VITALS
TEMPERATURE: 98 F | DIASTOLIC BLOOD PRESSURE: 76 MMHG | HEIGHT: 64 IN | SYSTOLIC BLOOD PRESSURE: 115 MMHG | WEIGHT: 154 LBS | BODY MASS INDEX: 26.29 KG/M2 | HEART RATE: 60 BPM

## 2018-12-04 DIAGNOSIS — Z00.00 ROUTINE GENERAL MEDICAL EXAMINATION AT A HEALTH CARE FACILITY: Primary | ICD-10-CM

## 2018-12-04 DIAGNOSIS — F41.9 ANXIETY: ICD-10-CM

## 2018-12-04 DIAGNOSIS — M54.12 CERVICAL RADICULOPATHY: ICD-10-CM

## 2018-12-04 DIAGNOSIS — I10 ESSENTIAL HYPERTENSION: ICD-10-CM

## 2018-12-04 PROCEDURE — 99396 PREV VISIT EST AGE 40-64: CPT | Performed by: FAMILY MEDICINE

## 2018-12-04 RX ORDER — PANTOPRAZOLE SODIUM 20 MG/1
20 TABLET, DELAYED RELEASE ORAL
Qty: 30 TABLET | Refills: 1 | Status: SHIPPED | OUTPATIENT
Start: 2018-12-04 | End: 2019-01-03

## 2018-12-04 RX ORDER — QUINAPRIL 20 MG/1
20 TABLET ORAL
Qty: 90 TABLET | Refills: 3 | Status: SHIPPED | OUTPATIENT
Start: 2018-12-04 | End: 2019-11-20

## 2018-12-04 RX ORDER — ALPRAZOLAM 0.5 MG/1
TABLET ORAL
Qty: 90 TABLET | Refills: 1 | Status: SHIPPED | OUTPATIENT
Start: 2018-12-04 | End: 2019-06-28

## 2018-12-04 RX ORDER — RIZATRIPTAN BENZOATE 10 MG/1
TABLET, ORALLY DISINTEGRATING ORAL
Qty: 9 TABLET | Refills: 3 | Status: SHIPPED | OUTPATIENT
Start: 2018-12-04 | End: 2020-02-10

## 2018-12-04 RX ORDER — VERAPAMIL HYDROCHLORIDE 240 MG/1
240 TABLET, FILM COATED, EXTENDED RELEASE ORAL
Qty: 90 TABLET | Refills: 3 | Status: SHIPPED | OUTPATIENT
Start: 2018-12-04 | End: 2019-12-09

## 2018-12-05 ENCOUNTER — TELEPHONE (OUTPATIENT)
Dept: FAMILY MEDICINE CLINIC | Facility: CLINIC | Age: 43
End: 2018-12-05

## 2018-12-05 NOTE — PROGRESS NOTES
HPI:    Patient ID: Axel Glaser is a 39year old female.     HPI  Patient presents with:  Physical: annual physical, med refills on all meds, UTD on flu shot     Past Medical History:   Diagnosis Date   • Abnormal uterine bleeding 07/01/2015   • Dirk notes Disp: 1 Bottle Rfl: 0   hydrochlorothiazide 25 MG Oral Tab Take 1 tablet (25 mg total) by mouth daily.  Disp: 90 tablet Rfl: 0     Allergies:No Known Allergies   PHYSICAL EXAM:   Physical Exam   Constitutional: She is oriented to person, place, and ti Complete. Anxiety  Refills.    Orders Placed This Encounter      Lipid Panel [E]      Meds This Visit:  Requested Prescriptions     Signed Prescriptions Disp Refills   • Pantoprazole Sodium 20 MG Oral Tab EC 30 tablet 1     Sig: Take 1 tablet (20 mg tot

## 2019-01-24 ENCOUNTER — HOSPITAL ENCOUNTER (OUTPATIENT)
Facility: HOSPITAL | Age: 44
Setting detail: HOSPITAL OUTPATIENT SURGERY
Discharge: HOME | End: 2019-01-24
Attending: INTERNAL MEDICINE | Admitting: INTERNAL MEDICINE
Payer: COMMERCIAL

## 2019-01-24 ENCOUNTER — ANESTHESIA EVENT (OUTPATIENT)
Dept: ENDOSCOPY | Facility: HOSPITAL | Age: 44
End: 2019-01-24
Payer: COMMERCIAL

## 2019-01-24 ENCOUNTER — ANESTHESIA (OUTPATIENT)
Dept: ENDOSCOPY | Facility: HOSPITAL | Age: 44
End: 2019-01-24
Payer: COMMERCIAL

## 2019-01-24 DIAGNOSIS — Z80.0 FAMILY HISTORY OF COLON CANCER: ICD-10-CM

## 2019-01-24 DIAGNOSIS — K59.00 CONSTIPATION, UNSPECIFIED CONSTIPATION TYPE: ICD-10-CM

## 2019-01-24 DIAGNOSIS — K62.5 RECTAL BLEEDING: ICD-10-CM

## 2019-01-24 DIAGNOSIS — R10.9 ABDOMINAL PAIN, UNSPECIFIED ABDOMINAL LOCATION: ICD-10-CM

## 2019-01-24 PROCEDURE — 45380 COLONOSCOPY AND BIOPSY: CPT | Performed by: INTERNAL MEDICINE

## 2019-01-24 PROCEDURE — 0DBL8ZX EXCISION OF TRANSVERSE COLON, VIA NATURAL OR ARTIFICIAL OPENING ENDOSCOPIC, DIAGNOSTIC: ICD-10-PCS | Performed by: INTERNAL MEDICINE

## 2019-01-24 RX ORDER — NALOXONE HYDROCHLORIDE 0.4 MG/ML
80 INJECTION, SOLUTION INTRAMUSCULAR; INTRAVENOUS; SUBCUTANEOUS AS NEEDED
Status: DISCONTINUED | OUTPATIENT
Start: 2019-01-24 | End: 2019-01-24

## 2019-01-24 RX ORDER — LIDOCAINE HYDROCHLORIDE 10 MG/ML
INJECTION, SOLUTION EPIDURAL; INFILTRATION; INTRACAUDAL; PERINEURAL AS NEEDED
Status: DISCONTINUED | OUTPATIENT
Start: 2019-01-24 | End: 2019-01-24 | Stop reason: SURG

## 2019-01-24 RX ORDER — SODIUM CHLORIDE, SODIUM LACTATE, POTASSIUM CHLORIDE, CALCIUM CHLORIDE 600; 310; 30; 20 MG/100ML; MG/100ML; MG/100ML; MG/100ML
INJECTION, SOLUTION INTRAVENOUS CONTINUOUS
Status: DISCONTINUED | OUTPATIENT
Start: 2019-01-24 | End: 2019-01-24

## 2019-01-24 RX ORDER — MIDAZOLAM HYDROCHLORIDE 1 MG/ML
INJECTION INTRAMUSCULAR; INTRAVENOUS AS NEEDED
Status: DISCONTINUED | OUTPATIENT
Start: 2019-01-24 | End: 2019-01-24 | Stop reason: SURG

## 2019-01-24 RX ADMIN — SODIUM CHLORIDE, SODIUM LACTATE, POTASSIUM CHLORIDE, CALCIUM CHLORIDE: 600; 310; 30; 20 INJECTION, SOLUTION INTRAVENOUS at 11:01:00

## 2019-01-24 RX ADMIN — SODIUM CHLORIDE, SODIUM LACTATE, POTASSIUM CHLORIDE, CALCIUM CHLORIDE: 600; 310; 30; 20 INJECTION, SOLUTION INTRAVENOUS at 10:42:00

## 2019-01-24 RX ADMIN — MIDAZOLAM HYDROCHLORIDE 2 MG: 1 INJECTION INTRAMUSCULAR; INTRAVENOUS at 10:49:00

## 2019-01-24 RX ADMIN — LIDOCAINE HYDROCHLORIDE 50 MG: 10 INJECTION, SOLUTION EPIDURAL; INFILTRATION; INTRACAUDAL; PERINEURAL at 10:44:00

## 2019-01-24 NOTE — H&P
History & Physical Examination    Patient Name: Shannen Sun  MRN: [id number]  [facility]: [id number]  YOB: 1975    Diagnosis: rectal bleeding/constipation        Medications Prior to Admission:  Quinapril HCl 20 MG Oral Tab Take 1 tablet ( essure placement   • LEEP     •        x 2 (vulva hematoma x 1)   • OTHER SURGICAL HISTORY      anuerism clip   • OTHER SURGICAL HISTORY      embx neg, cervical polyp (menorrhagia)     Family History   Problem Relation Age of Onset   • Cancer F

## 2019-01-24 NOTE — OPERATIVE REPORT
[facility] Endoscopy Report      Preoperative Diagnosis:  - abdominal pain  - constipation      Postoperative Diagnosis:  - external skin tags in perianal area  - internal hemorrhoids  - diverticulosis  - colon polyp x 1      Procedure:

## 2019-01-24 NOTE — ANESTHESIA POSTPROCEDURE EVALUATION
Patient: Beverly Tam    Procedure Summary     Date:  01/24/19 Room / Location:  [facility] ENDOSCOPY 05 / [facility] ENDOSCOPY    Anesthesia Start:  5857 Anesthesia Stop:      Procedure:  COLONOSCOPY (N/A ) Diagnosis:       Abdominal pain, unspecified abdomina

## 2019-01-24 NOTE — ANESTHESIA PREPROCEDURE EVALUATION
Anesthesia PreOp Note    HPI:     Diogenes Gannon is a 39year old female who presents for preoperative consultation requested by: Hailee Temple MD    Date of Surgery: 1/24/2019    Procedure(s):  COLONOSCOPY  Indication: Abdominal pain, unspecifi Admission:  Quinapril HCl 20 MG Oral Tab Take 1 tablet (20 mg total) by mouth once daily. Disp: 90 tablet Rfl: 3 1/23/2019 at 2000   Verapamil HCl  MG Oral Tab CR Take 1 tablet (240 mg total) by mouth once daily.  Disp: 90 tablet Rfl: 3 1/23/2019 at 2 and Sexual Activity      Alcohol use:  Yes        Alcohol/week: 0.0 oz        Comment: OCC      Drug use: No      Sexual activity: Not on file    Other Topics      Concerns:         Service: Not Asked        Blood Transfusions: Not Asked        Caff anesthetic plan, benefits, risks including possible dental damage if relevant, major complications, and any alternative forms of anesthetic management. All of the patient's questions were answered to the best of my ability.  The patient desires the anesth

## 2019-01-25 VITALS
OXYGEN SATURATION: 99 % | RESPIRATION RATE: 13 BRPM | WEIGHT: 155 LBS | DIASTOLIC BLOOD PRESSURE: 98 MMHG | SYSTOLIC BLOOD PRESSURE: 135 MMHG | BODY MASS INDEX: 26.46 KG/M2 | HEIGHT: 64 IN | HEART RATE: 68 BPM

## 2019-01-28 ENCOUNTER — TELEPHONE (OUTPATIENT)
Dept: GASTROENTEROLOGY | Facility: CLINIC | Age: 44
End: 2019-01-28

## 2019-02-08 ENCOUNTER — TELEPHONE (OUTPATIENT)
Dept: GASTROENTEROLOGY | Facility: CLINIC | Age: 44
End: 2019-02-08

## 2019-02-08 NOTE — TELEPHONE ENCOUNTER
Please advise on refill request below. Patient requesting 90 day supply. Thank you    LV colonoscopy on 01/24/19 with Dr. Tori Urban 12/04/18 #30 with 1 refill

## 2019-02-11 RX ORDER — PANTOPRAZOLE SODIUM 20 MG/1
20 TABLET, DELAYED RELEASE ORAL
Qty: 90 TABLET | Refills: 0 | Status: SHIPPED | OUTPATIENT
Start: 2019-02-11 | End: 2019-03-13

## 2019-02-11 NOTE — TELEPHONE ENCOUNTER
Pt contacted, informed, and accepted f/u appt with Clark Barcenas Feb 21 with 7:45am arrival and given directions to [facility].

## 2019-02-11 NOTE — TELEPHONE ENCOUNTER
Nursing: Please have the patient schedule a office follow-up with me as I previously requested at the last office visit. Rx refilled.

## 2019-02-13 NOTE — PROGRESS NOTES
[facility] Follow-up Visit    Zarina findings: External skin tags and perianal area, internal hemorrhoids, diverticulosis, #1 colon polyp, 5-year recall     Social Hx:  - No smoking  + rare etoh  - Denies illicit drug use   - LMP: hysterectomy   - Occupation: Data integrity  - Lives with spou mouth 2 (two) times daily as needed.  Disp: 60 capsule Rfl: 1   Pantoprazole Sodium 20 MG Oral Tab EC Take 1 tablet (20 mg total) by mouth every morning before breakfast. Disp: 90 tablet Rfl: 0   Quinapril HCl 20 MG Oral Tab Take 1 tablet (20 mg total) by m perfused   Lung: effort normal and breath sounds normal, no respiratory distress, wheezes or rales  GI: + Epigastric/right upper quadrant tenderness with guarding, soft, non-tender exam in all other quadrants without rigidity or guarding, non-distended, no etiology. If the above measures are not effective, could consider an upper endoscopy, repeat imaging, or consideration for a non-GI etiology such as musculoskeletal or referred pain from the back.     2.  Constipation: Significant improvement with increase

## 2019-02-21 ENCOUNTER — OFFICE VISIT (OUTPATIENT)
Dept: GASTROENTEROLOGY | Facility: CLINIC | Age: 44
End: 2019-02-21
Payer: COMMERCIAL

## 2019-02-21 VITALS
DIASTOLIC BLOOD PRESSURE: 77 MMHG | WEIGHT: 163.81 LBS | HEART RATE: 72 BPM | SYSTOLIC BLOOD PRESSURE: 113 MMHG | BODY MASS INDEX: 27.96 KG/M2 | HEIGHT: 64 IN

## 2019-02-21 DIAGNOSIS — K59.00 CONSTIPATION, UNSPECIFIED CONSTIPATION TYPE: ICD-10-CM

## 2019-02-21 DIAGNOSIS — R10.12 LUQ PAIN: ICD-10-CM

## 2019-02-21 DIAGNOSIS — R10.13 EPIGASTRIC PAIN: Primary | ICD-10-CM

## 2019-02-21 PROCEDURE — 99213 OFFICE O/P EST LOW 20 MIN: CPT | Performed by: NURSE PRACTITIONER

## 2019-02-21 PROCEDURE — 99212 OFFICE O/P EST SF 10 MIN: CPT | Performed by: NURSE PRACTITIONER

## 2019-02-21 RX ORDER — DICYCLOMINE HYDROCHLORIDE 10 MG/1
10 CAPSULE ORAL 2 TIMES DAILY PRN
Qty: 60 CAPSULE | Refills: 1 | Status: SHIPPED | OUTPATIENT
Start: 2019-02-21 | End: 2019-05-31 | Stop reason: ALTCHOICE

## 2019-02-21 NOTE — PATIENT INSTRUCTIONS
1. Complete all bladder ultrasound  2. Trial of Bentyl as needed  3.   Consider increasing Protonix to 40 mg

## 2019-03-26 ENCOUNTER — TELEPHONE (OUTPATIENT)
Dept: GASTROENTEROLOGY | Facility: CLINIC | Age: 44
End: 2019-03-26

## 2019-03-26 NOTE — TELEPHONE ENCOUNTER
Overdue results letter mailed out to pt for the following:    US Gallbladder    Also sent via [address] [place] [address] [address]

## 2019-05-16 ENCOUNTER — APPOINTMENT (OUTPATIENT)
Dept: LAB | Age: 44
End: 2019-05-16
Attending: NURSE PRACTITIONER
Payer: COMMERCIAL

## 2019-05-16 ENCOUNTER — HOSPITAL ENCOUNTER (OUTPATIENT)
Dept: ULTRASOUND IMAGING | Age: 44
Discharge: HOME | End: 2019-05-16
Attending: NURSE PRACTITIONER
Payer: COMMERCIAL

## 2019-05-16 DIAGNOSIS — Z00.00 ROUTINE GENERAL MEDICAL EXAMINATION AT A HEALTH CARE FACILITY: ICD-10-CM

## 2019-05-16 DIAGNOSIS — R10.13 EPIGASTRIC PAIN: ICD-10-CM

## 2019-05-16 PROCEDURE — 80061 LIPID PANEL: CPT

## 2019-05-16 PROCEDURE — 36415 COLL VENOUS BLD VENIPUNCTURE: CPT

## 2019-05-16 PROCEDURE — 76705 ECHO EXAM OF ABDOMEN: CPT | Performed by: NURSE PRACTITIONER

## 2019-05-31 ENCOUNTER — OFFICE VISIT (OUTPATIENT)
Dept: FAMILY MEDICINE CLINIC | Facility: CLINIC | Age: 44
End: 2019-05-31
Payer: COMMERCIAL

## 2019-05-31 VITALS
WEIGHT: 166 LBS | TEMPERATURE: 98 F | BODY MASS INDEX: 28.34 KG/M2 | DIASTOLIC BLOOD PRESSURE: 75 MMHG | HEART RATE: 78 BPM | SYSTOLIC BLOOD PRESSURE: 118 MMHG | HEIGHT: 64 IN

## 2019-05-31 DIAGNOSIS — R10.2 PELVIC PAIN: Primary | ICD-10-CM

## 2019-05-31 PROCEDURE — 99212 OFFICE O/P EST SF 10 MIN: CPT | Performed by: FAMILY MEDICINE

## 2019-05-31 PROCEDURE — 99214 OFFICE O/P EST MOD 30 MIN: CPT | Performed by: FAMILY MEDICINE

## 2019-05-31 NOTE — PROGRESS NOTES
HPI:    Patient ID: Dewey Lara is a 38year old female. HPI  Patient presents with:  Abdominal Pain: LT side pain by ribs, at times has to sleep on side due to pain, has sharp pains at times   Over six months.  Had CT in the fall with some ova

## 2019-06-06 ENCOUNTER — TELEPHONE (OUTPATIENT)
Dept: CASE MANAGEMENT | Age: 44
End: 2019-06-06

## 2019-06-06 NOTE — TELEPHONE ENCOUNTER
Dr. Carin Padilla,    The CT you ordered for Sarah Fernandez has been denied by her insurance company. Patient has been notified and advised to f/u with you for her future plan of care. I faxed the letter to your office.     Thank you  Beckie Cedeno

## 2019-06-12 NOTE — TELEPHONE ENCOUNTER
An ultrasound of the pelvis was already performed 10/2018. This letter states an US was not performed. Rodger Duarte ...

## 2019-06-19 NOTE — TELEPHONE ENCOUNTER
Dr. Ellie Finch,    When I submit the clinicals I submit all prior testing and they base their determination off of what I submit. She also had a CT of the abdomin and pelvis contrast only for left lower quadrant pain in 2018.     I did fax the denial letter

## 2019-06-28 RX ORDER — ALPRAZOLAM 0.5 MG/1
TABLET ORAL
Qty: 180 TABLET | Refills: 1 | Status: SHIPPED
Start: 2019-06-28 | End: 2020-02-10

## 2019-06-28 NOTE — TELEPHONE ENCOUNTER
Controlled medication pending for review. If approved needs to be called in or faxed by on-site staff.     Last Rx: 12/4/18  LOV: 5/31/19

## 2019-08-30 ENCOUNTER — OFFICE VISIT (OUTPATIENT)
Dept: FAMILY MEDICINE CLINIC | Facility: CLINIC | Age: 44
End: 2019-08-30
Payer: COMMERCIAL

## 2019-08-30 VITALS
SYSTOLIC BLOOD PRESSURE: 126 MMHG | HEART RATE: 77 BPM | WEIGHT: 169 LBS | DIASTOLIC BLOOD PRESSURE: 86 MMHG | HEIGHT: 64 IN | TEMPERATURE: 98 F | BODY MASS INDEX: 28.85 KG/M2

## 2019-08-30 DIAGNOSIS — R10.2 PELVIC PAIN: Primary | ICD-10-CM

## 2019-08-30 PROCEDURE — 99213 OFFICE O/P EST LOW 20 MIN: CPT | Performed by: FAMILY MEDICINE

## 2019-08-30 NOTE — PROGRESS NOTES
HPI:    Patient ID: Perry Velázquez is a 38year old female. HPI  Patient presents with:  Pain: LT side pain follow up, cat scan declined by insurance   Ongoing for over a year. CT and US have not determined the source. No improvement.  Rates as 4

## 2019-09-05 ENCOUNTER — OFFICE VISIT (OUTPATIENT)
Dept: SURGERY | Facility: CLINIC | Age: 44
End: 2019-09-05
Payer: COMMERCIAL

## 2019-09-05 VITALS — BODY MASS INDEX: 29 KG/M2 | WEIGHT: 169 LBS

## 2019-09-05 DIAGNOSIS — R10.12 LEFT UPPER QUADRANT PAIN: Primary | ICD-10-CM

## 2019-09-05 PROCEDURE — 99204 OFFICE O/P NEW MOD 45 MIN: CPT | Performed by: SURGERY

## 2019-09-09 PROBLEM — R10.12 LEFT UPPER QUADRANT PAIN: Status: ACTIVE | Noted: 2019-09-09

## 2019-09-10 NOTE — PROGRESS NOTES
HPI:    Patient ID: Tobi Badillo is a 38year old female presenting with Patient presents with:  Abdominal Pain: For over one year, the patient has been having pain on the mid left side 5/10, stabbing. Feels pain with palpation.   Not food related file      Food insecurity:        Worry: Not on file        Inability: Not on file      Transportation needs:        Medical: Not on file        Non-medical: Not on file    Tobacco Use      Smoking status: Never Smoker      Smokeless tobacco: Never Used Negative. Neurological: Negative. Hematological: Does not bruise/bleed easily. Psychiatric/Behavioral: Negative.            Current Outpatient Medications:  ALPRAZolam 0.5 MG Oral Tab TAKE 1 TABLET BY MOUTH TWICE DAILY AS NEEDED Disp: 180 tablet Rfl of abdominal scar tissue from trocar incisions.          Grayson Hlal MD  9/5/2019

## 2019-10-08 NOTE — PROGRESS NOTES
[facility] Follow-up Visit    Zarina below  - No known issues with sedation.  - No known history of sleep apnea.     Pertinent Family Hx:  + Colon CA, maternal uncle (dx age 47)  + Pancreatic CA, father  - No family hx of esophageal, gastric cancer  - No family history of IBD.     Prior endos Cancer Neg       Social History: Social History    Tobacco Use      Smoking status: Never Smoker      Smokeless tobacco: Never Used    Alcohol use:  Yes      Alcohol/week: 0.0 standard drinks      Comment: OCC    Drug use: No       Medications (Active prior other quadrants without rigidity or guarding, non-distended, no abnormal bowel sounds noted, no masses are palpated  : no CVA tenderness  Skin: dry, warm, no jaundice  Ext: no cyanosis, clubbing or edema is evident.    Neuro: Alert and oriented x4, and pa possible biopsy w/Dr. Chavez Bedolla w/ MAC  -Eligible for NE: Yes  -Anti-platelets and anti-coagulants: None  -Diabetes meds: None    ** If MAC @ [facility]/NE:    - HOLD quinapril the night before and/or the day of the procedure(s)   - NO alcohol, recreational drugs nor

## 2019-10-15 ENCOUNTER — OFFICE VISIT (OUTPATIENT)
Dept: GASTROENTEROLOGY | Facility: CLINIC | Age: 44
End: 2019-10-15
Payer: COMMERCIAL

## 2019-10-15 ENCOUNTER — TELEPHONE (OUTPATIENT)
Dept: GASTROENTEROLOGY | Facility: CLINIC | Age: 44
End: 2019-10-15

## 2019-10-15 VITALS
HEIGHT: 64 IN | HEART RATE: 63 BPM | BODY MASS INDEX: 28.96 KG/M2 | DIASTOLIC BLOOD PRESSURE: 86 MMHG | WEIGHT: 169.63 LBS | SYSTOLIC BLOOD PRESSURE: 125 MMHG

## 2019-10-15 DIAGNOSIS — R10.10 UPPER ABDOMINAL PAIN: Primary | ICD-10-CM

## 2019-10-15 PROCEDURE — 99214 OFFICE O/P EST MOD 30 MIN: CPT | Performed by: NURSE PRACTITIONER

## 2019-10-15 NOTE — PATIENT INSTRUCTIONS
Recommend:  -Schedule EGD w/ possible biopsy w/Dr. Orion Ely w/ MAC  Dx: upper abd pain   -Eligible for NE: Yes  -Anti-platelets and anti-coagulants: None  -Diabetes meds: None    ** If MAC @ [facility]/NE:    - HOLD quinapril the night before and/or the day of the p

## 2019-10-15 NOTE — TELEPHONE ENCOUNTER
Scheduled for:  EGD - 31085  Provider Name:  Dr. Radha Adams  Date:  10/22/19  Location:  [facility]  Sedation:  MAC  Time:  7:30 am (pt is aware to arrive at 6:30 am)  Prep:  NPO after midnight, Prep instructions were given to pt in the office, pt verbalized und

## 2019-10-22 ENCOUNTER — TELEPHONE (OUTPATIENT)
Dept: GASTROENTEROLOGY | Facility: CLINIC | Age: 44
End: 2019-10-22

## 2019-10-22 ENCOUNTER — ANESTHESIA EVENT (OUTPATIENT)
Dept: ENDOSCOPY | Age: 44
End: 2019-10-22
Payer: COMMERCIAL

## 2019-10-22 ENCOUNTER — ANESTHESIA (OUTPATIENT)
Dept: ENDOSCOPY | Age: 44
End: 2019-10-22
Payer: COMMERCIAL

## 2019-10-22 ENCOUNTER — HOSPITAL ENCOUNTER (OUTPATIENT)
Age: 44
Setting detail: HOSPITAL OUTPATIENT SURGERY
Discharge: HOME | End: 2019-10-22
Attending: INTERNAL MEDICINE | Admitting: INTERNAL MEDICINE
Payer: COMMERCIAL

## 2019-10-22 DIAGNOSIS — R10.10 UPPER ABDOMINAL PAIN: ICD-10-CM

## 2019-10-22 DIAGNOSIS — R10.10 UPPER ABDOMINAL PAIN: Primary | ICD-10-CM

## 2019-10-22 DIAGNOSIS — R93.5 ABNORMAL ABDOMINAL CT SCAN: ICD-10-CM

## 2019-10-22 PROCEDURE — 43239 EGD BIOPSY SINGLE/MULTIPLE: CPT | Performed by: INTERNAL MEDICINE

## 2019-10-22 PROCEDURE — 99070 SPECIAL SUPPLIES PHYS/QHP: CPT | Performed by: INTERNAL MEDICINE

## 2019-10-22 RX ORDER — SODIUM CHLORIDE, SODIUM LACTATE, POTASSIUM CHLORIDE, CALCIUM CHLORIDE 600; 310; 30; 20 MG/100ML; MG/100ML; MG/100ML; MG/100ML
INJECTION, SOLUTION INTRAVENOUS CONTINUOUS
Status: DISCONTINUED | OUTPATIENT
Start: 2019-10-22 | End: 2019-10-22

## 2019-10-22 RX ORDER — SODIUM CHLORIDE, SODIUM LACTATE, POTASSIUM CHLORIDE, CALCIUM CHLORIDE 600; 310; 30; 20 MG/100ML; MG/100ML; MG/100ML; MG/100ML
INJECTION, SOLUTION INTRAVENOUS CONTINUOUS
Status: CANCELLED | OUTPATIENT
Start: 2019-10-22

## 2019-10-22 RX ORDER — LIDOCAINE HYDROCHLORIDE 10 MG/ML
INJECTION, SOLUTION EPIDURAL; INFILTRATION; INTRACAUDAL; PERINEURAL AS NEEDED
Status: DISCONTINUED | OUTPATIENT
Start: 2019-10-22 | End: 2019-10-22 | Stop reason: SURG

## 2019-10-22 RX ORDER — NALOXONE HYDROCHLORIDE 0.4 MG/ML
80 INJECTION, SOLUTION INTRAMUSCULAR; INTRAVENOUS; SUBCUTANEOUS AS NEEDED
Status: CANCELLED | OUTPATIENT
Start: 2019-10-22 | End: 2019-10-22

## 2019-10-22 RX ADMIN — LIDOCAINE HYDROCHLORIDE 50 MG: 10 INJECTION, SOLUTION EPIDURAL; INFILTRATION; INTRACAUDAL; PERINEURAL at 07:43:00

## 2019-10-22 RX ADMIN — SODIUM CHLORIDE, SODIUM LACTATE, POTASSIUM CHLORIDE, CALCIUM CHLORIDE: 600; 310; 30; 20 INJECTION, SOLUTION INTRAVENOUS at 08:00:00

## 2019-10-22 RX ADMIN — SODIUM CHLORIDE, SODIUM LACTATE, POTASSIUM CHLORIDE, CALCIUM CHLORIDE: 600; 310; 30; 20 INJECTION, SOLUTION INTRAVENOUS at 07:43:00

## 2019-10-22 NOTE — H&P
History & Physical Examination    Patient Name: Geovanny Meehan  MRN: [id number]  CSN: [id number]  YOB: 1975    Diagnosis: abdominal pain      ALPRAZolam 0.5 MG Oral Tab, TAKE 1 TABLET BY MOUTH TWICE DAILY AS NEEDED, Disp: 180 tablet, R Disease Maternal Grandmother         \"mad cow disease\"   • Breast Cancer Neg      Social History    Tobacco Use      Smoking status: Never Smoker      Smokeless tobacco: Never Used    Alcohol use:  Yes      Alcohol/week: 0.0 standard drinks      Comment:

## 2019-10-22 NOTE — ANESTHESIA PREPROCEDURE EVALUATION
Anesthesia PreOp Note    HPI:     Rachell Bar is a 38year old female who presents for preoperative consultation requested by: Kiera Olsen MD    Date of Surgery: 10/22/2019    Procedure(s):  ESOPHAGOGASTRODUODENOSCOPY (EGD)  Indication: Pio Lara OTHER SURGICAL HISTORY      anuerism clip   • OTHER SURGICAL HISTORY      embx neg, cervical polyp (menorrhagia)       ALPRAZolam 0.5 MG Oral Tab, TAKE 1 TABLET BY MOUTH TWICE DAILY AS NEEDED, Disp: 180 tablet, Rfl: 1, 10/21/2019  Quinapril HCl 20 MG Oral Relationships      Social connections:        Talks on phone: Not on file        Gets together: Not on file        Attends Tenriism service: Not on file        Active member of club or organization: Not on file        Attends meetings of clubs or [place] Surgeon      I have informed Pavel Lopez and/or legal guardian or family member of the nature of the anesthetic plan, benefits, risks including possible dental damage if relevant, major complications, and any alternative forms of anesthetic manag

## 2019-10-22 NOTE — OPERATIVE REPORT
[facility] Endoscopy Report      Preoperative Diagnosis:  - LUQ pain      Postoperative Diagnosis:  - hiatal hernia 2 cm  - reflux esophagitis  - gastric polyps      Procedure:    Esophagogastroduodenoscopy       Surgeon:  ELMIRA Villalobos

## 2019-10-22 NOTE — TELEPHONE ENCOUNTER
Triny Lozano  EGD today without cause for abdominal pain LUQ, can you follow up on bx results. Prior CT scan from 2018, they advised repeat ct in 6 months for findings, see below.  Can you make sure this gets done, thanks          =====  CONCLUSION:      Susp

## 2019-10-22 NOTE — ANESTHESIA POSTPROCEDURE EVALUATION
Patient: Indio Nixon    Procedure Summary     Date:  10/22/19 Room / Location:  [facility] ENDOSCOPY 01 / [facility] ENDO    Anesthesia Start:  2400 Anesthesia Stop:  0800    Procedure:  ESOPHAGOGASTRODUODENOSCOPY (EGD) (N/A ) Diagnosis:       Upper abdomi

## 2019-10-23 VITALS
DIASTOLIC BLOOD PRESSURE: 83 MMHG | RESPIRATION RATE: 12 BRPM | SYSTOLIC BLOOD PRESSURE: 114 MMHG | BODY MASS INDEX: 28.85 KG/M2 | HEART RATE: 73 BPM | WEIGHT: 169 LBS | HEIGHT: 64 IN | OXYGEN SATURATION: 97 %

## 2019-10-24 ENCOUNTER — TELEPHONE (OUTPATIENT)
Dept: GASTROENTEROLOGY | Facility: CLINIC | Age: 44
End: 2019-10-24

## 2019-10-24 ENCOUNTER — PATIENT MESSAGE (OUTPATIENT)
Dept: FAMILY MEDICINE CLINIC | Facility: CLINIC | Age: 44
End: 2019-10-24

## 2019-10-24 NOTE — TELEPHONE ENCOUNTER
Forwarded to [employer] there anything else you wanted done by RN? I note Dr Renata Fitzpatrick had ordered CT abd/pelvis back in May that was not done. Please advise. Thanks.

## 2019-10-24 NOTE — TELEPHONE ENCOUNTER
From: Dewey Lara  To: Kathy Puentes DO  Sent: 10/24/2019 1:31 PM CDT  Subject: Other    Hi Dr. Bing Sauceda,  I have been asked by Dr. Malcolm Cortez office to see if I am able to get my CT approved since it has been over a year.  Is this still available

## 2019-10-24 NOTE — TELEPHONE ENCOUNTER
I left a detailed voicemail (okay per HIPAA) regarding her recent EGD/biopsy results - recommend follow-up celiac sprue serologic testing to follow-up on biopsy findings to rule out celiac disease. Lab orders have been placed.   She should also repeat CT i

## 2019-10-24 NOTE — TELEPHONE ENCOUNTER
Sherry AJ--please place order for CT abd pelvis--I spoke to [facility] care, the one ordered last May per Dr Geanie Cooks was denied, but pt has since been seen by us and had procedure, so more likely to get approved now. Thanks.  Please send back to RNs so we can

## 2019-10-24 NOTE — TELEPHONE ENCOUNTER
Patient requesting new orders for [employer], due to her husbands insurance, patient asked to be sent to her dominic hernandes.

## 2019-10-24 NOTE — TELEPHONE ENCOUNTER
Dr. Kristy Smart,    Pt's biopsy results c/w fundic gland polyps, no H Pylori. The duodenal biopsy showed increased lymphocytes w/ preserved villous architecture - comment indicated this could be r/t NSAID use vs early celiac sprue?  Would follow serologic testing

## 2019-10-24 NOTE — TELEPHONE ENCOUNTER
Agree with celiac testing and repeat imaging, thanks for arranging and following up with the pt.    Dr. Gloria Galvez

## 2019-10-24 NOTE — TELEPHONE ENCOUNTER
Lab lynsey does not need new orders they will take [facility] lab order. Called patient informed her of this and faxed orders to Fax: [place] location for pt to go to.   Phone: [phone]     Orders faxed to above fax #

## 2019-10-24 NOTE — TELEPHONE ENCOUNTER
Managed Care,     Please authorize CT abd/pelv placed by JEAN MARIE Quispe today. Pt is scheduled 10/30/19. There was a CT abd/pelv ordered by Dr. dAeel Gillette that was denied. Thank you.

## 2019-10-28 NOTE — TELEPHONE ENCOUNTER
Pt notified about CT abd/pelv approval.  Denied further questions. Pt scheduled as below.     Future Appointments   Date Time Provider Abe Simental   10/30/2019  8:00 AM [facility] CT RM1 [facility] CT SCAN EM [facility]

## 2019-10-30 ENCOUNTER — HOSPITAL ENCOUNTER (OUTPATIENT)
Dept: CT IMAGING | Facility: HOSPITAL | Age: 44
Discharge: HOME | End: 2019-10-30
Attending: FAMILY MEDICINE
Payer: COMMERCIAL

## 2019-10-30 ENCOUNTER — TELEPHONE (OUTPATIENT)
Dept: GASTROENTEROLOGY | Facility: CLINIC | Age: 44
End: 2019-10-30

## 2019-10-30 DIAGNOSIS — R10.10 UPPER ABDOMINAL PAIN: ICD-10-CM

## 2019-10-30 DIAGNOSIS — R10.2 PELVIC PAIN: ICD-10-CM

## 2019-10-30 DIAGNOSIS — R93.5 ABNORMAL ABDOMINAL CT SCAN: ICD-10-CM

## 2019-10-30 PROCEDURE — 74177 CT ABD & PELVIS W/CONTRAST: CPT | Performed by: FAMILY MEDICINE

## 2019-10-30 NOTE — TELEPHONE ENCOUNTER
Nursing: I think we might be missing a lab. I ordered both TTG IgA and IgA. There should be to test results and this indicates only one test result. Perhaps we should contact Stefan Sprague and inquire.

## 2019-10-31 NOTE — TELEPHONE ENCOUNTER
Spoke to Darby from [facility] states they never received order for Immunoglubulin A. However, I asked if sample blood given for Tissue transglutaminase AB IGA could be used for Immunoglublin and she stated it could. Order was added on.      [address]- We will cathy

## 2019-11-04 ENCOUNTER — PATIENT MESSAGE (OUTPATIENT)
Dept: GASTROENTEROLOGY | Facility: CLINIC | Age: 44
End: 2019-11-04

## 2019-11-04 NOTE — TELEPHONE ENCOUNTER
From: Shahab Garrison  To: MAC Erickson  Sent: 11/4/2019 3:59 PM CST  Subject: Test Results Question    Hello,  I was just wondering if you had my test results from my blood work done on 10/25 and also my CT scan? Thanks!

## 2019-11-12 NOTE — TELEPHONE ENCOUNTER
Jim AJ--please see below. I contacted [facility], they faxed results--placed on your desk. Thanks.

## 2019-11-14 NOTE — TELEPHONE ENCOUNTER
Nursing: can we check on this RF request? When I saw pt in office (Oct. 2019) she reported no s/s relief w/ Protonix and I was under the impression we may not be continuing Rx.  Does she wish to continue Rx or is she f/u w/ Dr. Mateo Nicholson regarding additional opti

## 2019-11-14 NOTE — TELEPHONE ENCOUNTER
Requested Prescriptions     Pending Prescriptions Disp Refills   • PANTOPRAZOLE SODIUM 20 MG Oral Tab EC [Pharmacy Med Name: PANTOPRAZOLE SOD DR TABS 20MG] 90 tablet 4     Sig: TAKE 1 TABLET EVERY MORNING BEFORE BREAKFAST     lov-10/22/19

## 2019-11-15 NOTE — TELEPHONE ENCOUNTER
Please review; protocol failed.   Requested Prescriptions     Pending Prescriptions Disp Refills   • QUINAPRIL HCL 20 MG Oral Tab [Pharmacy Med Name: QUINAPRIL TABS 20MG] 90 tablet 4     Sig: TAKE 1 TABLET DAILY     Recent Visits  Date Type Provider Dept [facility] 14.6 10/19/2018    GFRNAA >60 10/19/2018    GFRAA >60 10/19/2018    CA 9.2 10/19/2018    ALKPHOS 60 11/25/2015    AST 16 10/15/2018    ALT 16 10/15/2018    BILT 0.5 10/15/2018    TP 6.2 10/15/2018    ALB 3.5 10/15/2018     (L) 10/19/2018    K

## 2019-11-18 RX ORDER — PANTOPRAZOLE SODIUM 20 MG/1
TABLET, DELAYED RELEASE ORAL
Qty: 90 TABLET | Refills: 4 | OUTPATIENT
Start: 2019-11-18

## 2019-11-19 NOTE — TELEPHONE ENCOUNTER
Please review; protocol failed. Requested Prescriptions     Pending Prescriptions Disp Refills   • Quinapril HCl 20 MG Oral Tab 90 tablet 3     Sig: Take 1 tablet (20 mg total) by mouth once daily.          Recent Visits  Date Type Provider Dept   08/30/

## 2019-11-20 RX ORDER — QUINAPRIL 20 MG/1
TABLET ORAL
Qty: 90 TABLET | Refills: 3 | Status: SHIPPED | OUTPATIENT
Start: 2019-11-20 | End: 2019-12-09

## 2019-11-21 ENCOUNTER — OFFICE VISIT (OUTPATIENT)
Dept: SURGERY | Facility: CLINIC | Age: 44
End: 2019-11-21
Payer: COMMERCIAL

## 2019-11-21 DIAGNOSIS — R10.12 LEFT UPPER QUADRANT PAIN: Primary | ICD-10-CM

## 2019-11-21 PROCEDURE — 99212 OFFICE O/P EST SF 10 MIN: CPT | Performed by: SURGERY

## 2019-11-21 RX ORDER — QUINAPRIL 20 MG/1
20 TABLET ORAL
Qty: 90 TABLET | Refills: 1 | Status: SHIPPED | OUTPATIENT
Start: 2019-11-21 | End: 2020-12-05

## 2019-11-24 NOTE — PROGRESS NOTES
Patient presents with:  Abdominal Pain: Pt states here to discuss surgery options. Pt cont. to have constant LUQ pain. Pt also has intermittent sharp stabbing pain. Pt denies fever, constipation/diarrhea or dif. urinating.      Gen:  NAD  Abd:  Soft, NTND

## 2019-12-09 ENCOUNTER — OFFICE VISIT (OUTPATIENT)
Dept: FAMILY MEDICINE CLINIC | Facility: CLINIC | Age: 44
End: 2019-12-09
Payer: COMMERCIAL

## 2019-12-09 VITALS
TEMPERATURE: 98 F | HEART RATE: 73 BPM | HEIGHT: 64 IN | BODY MASS INDEX: 28.51 KG/M2 | DIASTOLIC BLOOD PRESSURE: 75 MMHG | SYSTOLIC BLOOD PRESSURE: 120 MMHG | WEIGHT: 167 LBS

## 2019-12-09 DIAGNOSIS — I10 ESSENTIAL HYPERTENSION: ICD-10-CM

## 2019-12-09 DIAGNOSIS — R10.2 PELVIC PAIN: ICD-10-CM

## 2019-12-09 DIAGNOSIS — Z00.00 ROUTINE GENERAL MEDICAL EXAMINATION AT A HEALTH CARE FACILITY: Primary | ICD-10-CM

## 2019-12-09 DIAGNOSIS — F41.9 ANXIETY: ICD-10-CM

## 2019-12-09 PROCEDURE — 99396 PREV VISIT EST AGE 40-64: CPT | Performed by: FAMILY MEDICINE

## 2019-12-09 RX ORDER — QUINAPRIL 20 MG/1
20 TABLET ORAL
Qty: 90 TABLET | Refills: 3 | Status: SHIPPED | OUTPATIENT
Start: 2019-12-09 | End: 2019-12-16

## 2019-12-09 RX ORDER — VERAPAMIL HYDROCHLORIDE 240 MG/1
240 TABLET, FILM COATED, EXTENDED RELEASE ORAL
Qty: 90 TABLET | Refills: 3 | Status: SHIPPED | OUTPATIENT
Start: 2019-12-09 | End: 2020-12-05

## 2019-12-09 NOTE — PROGRESS NOTES
HPI:    Patient ID: Gilberto Degree is a 38year old female. HPI  Patient presents with:  Physical: annual physical   Pain: LT side pain persistant     Review of Systems   Constitutional: Negative. HENT: Negative. Eyes: Negative.     Respirat thyroid mass and no thyromegaly present. Cardiovascular: Normal heart sounds. Pulses:       Radial pulses are 2+ on the right side and 2+ on the left side.    Pulmonary/Chest: Effort normal and breath sounds normal.   Abdominal: There is no splenomegaly

## 2019-12-12 ENCOUNTER — LAB ENCOUNTER (OUTPATIENT)
Dept: LAB | Age: 44
End: 2019-12-12
Attending: FAMILY MEDICINE
Payer: COMMERCIAL

## 2019-12-12 DIAGNOSIS — Z00.00 ROUTINE GENERAL MEDICAL EXAMINATION AT A HEALTH CARE FACILITY: ICD-10-CM

## 2019-12-12 PROCEDURE — 36415 COLL VENOUS BLD VENIPUNCTURE: CPT

## 2019-12-12 PROCEDURE — 80053 COMPREHEN METABOLIC PANEL: CPT

## 2019-12-12 PROCEDURE — 85025 COMPLETE CBC W/AUTO DIFF WBC: CPT

## 2019-12-16 ENCOUNTER — OFFICE VISIT (OUTPATIENT)
Dept: NEUROLOGY | Facility: CLINIC | Age: 44
End: 2019-12-16

## 2019-12-16 VITALS
RESPIRATION RATE: 18 BRPM | HEIGHT: 64 IN | WEIGHT: 167 LBS | DIASTOLIC BLOOD PRESSURE: 80 MMHG | HEART RATE: 76 BPM | SYSTOLIC BLOOD PRESSURE: 116 MMHG | BODY MASS INDEX: 28.51 KG/M2

## 2019-12-16 DIAGNOSIS — R10.33 PERIUMBILICAL ABDOMINAL PAIN: Primary | ICD-10-CM

## 2019-12-16 DIAGNOSIS — F41.1 ANXIETY STATE: ICD-10-CM

## 2019-12-16 DIAGNOSIS — I67.1 BRAIN ANEURYSM: ICD-10-CM

## 2019-12-16 DIAGNOSIS — M72.2 PLANTAR FASCIAL FIBROMATOSIS: ICD-10-CM

## 2019-12-16 PROCEDURE — 99203 OFFICE O/P NEW LOW 30 MIN: CPT | Performed by: PHYSICAL MEDICINE & REHABILITATION

## 2019-12-16 NOTE — PATIENT INSTRUCTIONS
-Pelvic floor therapy  -Tylenol as needed  -Ice/Heat the area of discomfort  -Will consider diagnostic ultrasound evaluation  -follow up in 4 weeks after starting PT

## 2019-12-16 NOTE — PROGRESS NOTES
[address]  NEW PATIENT EVALUATION    Consultation as a request of Dr. Katie Nathan    Chief Complaint: abdominal pain.     HISTORY OF PRESENT ILLNESS:   Patient presents with:  Abdominal Pain: new right handed pa Brain aneurysm 1999    neuro clipping   • Decorative tattoo    • Dysmenorrhea 01/01/2014   • Essential hypertension    • High blood pressure    • HYPERTENSION    • Hypertension 01/01/1999   • Lipid screening 9/14/2011    per NG   • Menometrorrhagia     end Smokeless tobacco: Never Used    Alcohol use:  Yes      Alcohol/week: 0.0 standard drinks      Comment: OCC    Drug use: No         REVIEW OF SYSTEMS:   Patient-reported ROS  Constitutional  Sleep Disturbance: denies  Chills: denies  Fever: denies  Weight G kyphosis    Musculoskeletal/Neurological Exam:    LUMBAR SPINE:  Inspection: no erythema, swelling, or obvious deformity.   Their iliac crest and shoulder heights are symmetrical.     Palpation: Non tender to palpation of the spinous process or lumbar debbi 11/25/2015     12/12/2019    K 3.9 12/12/2019     12/12/2019    CO2 25.0 12/12/2019     Lab Results   Component Value Date    INR 1.0 (L) 05/01/2008     No results found for: VITD, QVITD, VITD25, UKCQ49KC    IMAGING:     CT Scan abdomen/pelvis verbalized understanding with the plan and was in agreement. All questions/concerns were addressed and there were no barriers to learning. Ines BARTHOLOMEW. [facility]  Physical Medicine and Rehabilitation/Sports Medicine

## 2020-02-11 RX ORDER — RIZATRIPTAN BENZOATE 10 MG/1
TABLET, ORALLY DISINTEGRATING ORAL
Qty: 90 TABLET | Refills: 1 | Status: SHIPPED | OUTPATIENT
Start: 2020-02-11 | End: 2021-02-12

## 2020-02-11 RX ORDER — ALPRAZOLAM 0.5 MG/1
TABLET ORAL
Qty: 180 TABLET | Refills: 1 | Status: SHIPPED | OUTPATIENT
Start: 2020-02-11 | End: 2020-08-21

## 2020-02-11 NOTE — TELEPHONE ENCOUNTER
HIGH WARNING due to Lactation for Rizatriptan. NO PROTOCOL for alprazolam.      Refill passed per [facility] protocol.     Refill Protocol Appointment Criteria  · Appointment scheduled in the past 6 months or in the next 3 months  Recent Outpatient Vi

## 2020-03-05 ENCOUNTER — OFFICE VISIT (OUTPATIENT)
Dept: OBGYN CLINIC | Facility: CLINIC | Age: 45
End: 2020-03-05
Payer: COMMERCIAL

## 2020-03-05 VITALS
DIASTOLIC BLOOD PRESSURE: 85 MMHG | WEIGHT: 170.81 LBS | SYSTOLIC BLOOD PRESSURE: 141 MMHG | BODY MASS INDEX: 29 KG/M2 | HEART RATE: 59 BPM

## 2020-03-05 DIAGNOSIS — Z90.710 HISTORY OF HYSTERECTOMY: ICD-10-CM

## 2020-03-05 DIAGNOSIS — Z12.31 VISIT FOR SCREENING MAMMOGRAM: ICD-10-CM

## 2020-03-05 DIAGNOSIS — Z01.419 ENCOUNTER FOR GYNECOLOGICAL EXAMINATION WITHOUT ABNORMAL FINDING: Primary | ICD-10-CM

## 2020-03-05 PROCEDURE — 99396 PREV VISIT EST AGE 40-64: CPT | Performed by: OBSTETRICS & GYNECOLOGY

## 2020-03-07 ENCOUNTER — HOSPITAL ENCOUNTER (OUTPATIENT)
Dept: MAMMOGRAPHY | Age: 45
Discharge: HOME | End: 2020-03-07
Attending: OBSTETRICS & GYNECOLOGY
Payer: COMMERCIAL

## 2020-03-07 DIAGNOSIS — Z12.31 VISIT FOR SCREENING MAMMOGRAM: ICD-10-CM

## 2020-03-07 PROCEDURE — 77067 SCR MAMMO BI INCL CAD: CPT | Performed by: OBSTETRICS & GYNECOLOGY

## 2020-03-07 PROCEDURE — 77063 BREAST TOMOSYNTHESIS BI: CPT | Performed by: OBSTETRICS & GYNECOLOGY

## 2020-03-11 PROBLEM — Z90.710 HISTORY OF HYSTERECTOMY: Status: ACTIVE | Noted: 2020-03-11

## 2020-03-11 NOTE — PROGRESS NOTES
Ahmad Bosworth is a 54year old female [id number] Patient's last menstrual period was 02/14/2016. Patient presents with:  Gyn Exam: Annual exam -- seeing PT Patrick Morales for abd discomfort -- told organs shifted after TLH --- getting better  .     OB History    Socioeconomic History      Marital status:       Spouse name: Not on file      Number of children: Not on file      Years of education: Not on file      Highest education level: Not on file    Occupational History      Not on file    Soci not use an assistive device. .        The patient does live in a home with stairs.       FAMILY HISTORY:  Family History   Problem Relation Age of Onset   • Cancer Father         pancreatic   • Colon Cancer Maternal Uncle    • Infectious Disease Maternal Gra tenderness, asymmetry, nipple discharge, nipple retraction or skin changes  Abdomen:   soft, nontender, nondistended, no masses  Skin/Hair:  no unusual rashes or bruises  Extremities:  no edema, no cyanosis  Psychiatric:   oriented to time, place, person a

## 2020-08-24 RX ORDER — ALPRAZOLAM 0.5 MG/1
TABLET ORAL
Qty: 180 TABLET | Refills: 1 | Status: SHIPPED | OUTPATIENT
Start: 2020-08-24 | End: 2021-02-12

## 2020-12-05 RX ORDER — QUINAPRIL 20 MG/1
20 TABLET ORAL DAILY
Qty: 90 TABLET | Refills: 0 | Status: SHIPPED | OUTPATIENT
Start: 2020-12-05 | End: 2021-02-12

## 2020-12-05 RX ORDER — VERAPAMIL HYDROCHLORIDE 240 MG/1
240 TABLET, FILM COATED, EXTENDED RELEASE ORAL DAILY
Qty: 90 TABLET | Refills: 0 | Status: SHIPPED | OUTPATIENT
Start: 2020-12-05 | End: 2021-02-12

## 2021-02-12 ENCOUNTER — OFFICE VISIT (OUTPATIENT)
Dept: FAMILY MEDICINE CLINIC | Facility: CLINIC | Age: 46
End: 2021-02-12
Payer: COMMERCIAL

## 2021-02-12 VITALS
HEART RATE: 69 BPM | BODY MASS INDEX: 29.37 KG/M2 | WEIGHT: 172 LBS | DIASTOLIC BLOOD PRESSURE: 81 MMHG | SYSTOLIC BLOOD PRESSURE: 128 MMHG | HEIGHT: 64 IN

## 2021-02-12 DIAGNOSIS — I10 ESSENTIAL HYPERTENSION: ICD-10-CM

## 2021-02-12 DIAGNOSIS — F41.9 ANXIETY: ICD-10-CM

## 2021-02-12 DIAGNOSIS — Z00.00 ROUTINE GENERAL MEDICAL EXAMINATION AT A HEALTH CARE FACILITY: Primary | ICD-10-CM

## 2021-02-12 PROCEDURE — 99396 PREV VISIT EST AGE 40-64: CPT | Performed by: FAMILY MEDICINE

## 2021-02-12 PROCEDURE — 3008F BODY MASS INDEX DOCD: CPT | Performed by: FAMILY MEDICINE

## 2021-02-12 PROCEDURE — 3079F DIAST BP 80-89 MM HG: CPT | Performed by: FAMILY MEDICINE

## 2021-02-12 PROCEDURE — 3074F SYST BP LT 130 MM HG: CPT | Performed by: FAMILY MEDICINE

## 2021-02-12 RX ORDER — RIZATRIPTAN BENZOATE 5 MG/1
5 TABLET, ORALLY DISINTEGRATING ORAL AS NEEDED
Qty: 30 TABLET | Refills: 3 | Status: SHIPPED | OUTPATIENT
Start: 2021-02-12

## 2021-02-12 RX ORDER — ALPRAZOLAM 0.5 MG/1
TABLET ORAL
Qty: 180 TABLET | Refills: 1 | Status: SHIPPED | OUTPATIENT
Start: 2021-02-12 | End: 2021-09-16

## 2021-02-12 RX ORDER — QUINAPRIL 20 MG/1
20 TABLET ORAL DAILY
Qty: 90 TABLET | Refills: 3 | Status: SHIPPED | OUTPATIENT
Start: 2021-02-12

## 2021-02-12 RX ORDER — VERAPAMIL HYDROCHLORIDE 240 MG/1
240 TABLET, FILM COATED, EXTENDED RELEASE ORAL DAILY
Qty: 90 TABLET | Refills: 3 | Status: SHIPPED | OUTPATIENT
Start: 2021-02-12

## 2021-02-12 NOTE — PROGRESS NOTES
HPI:    Patient ID: Dewey Lara is a 54year old female.     HPI  Patient presents with:  Physical: annual physical   Pain: had chest pain that was sharp that radiated to head, never happened in past no other episode   Cough: cough phlym on chest Physical Exam   Constitutional: She is oriented to person, place, and time. She appears well-developed and well-nourished. HENT:   Head: Normocephalic.    Right Ear: Hearing, tympanic membrane, external ear and ear canal normal.   Left Ear: Hearing, tym Disp Refills   • ALPRAZolam 0.5 MG Oral Tab 180 tablet 1     Sig: TAKE 1 TABLET BY MOUTH TWICE DAILY AS NEEDED   • Quinapril HCl 20 MG Oral Tab 90 tablet 3     Sig: Take 1 tablet (20 mg total) by mouth daily.    • Verapamil HCl  MG Oral Tab CR 90 tabl

## 2021-05-19 ENCOUNTER — IMMUNIZATION (OUTPATIENT)
Dept: LAB | Facility: HOSPITAL | Age: 46
End: 2021-05-19
Attending: EMERGENCY MEDICINE
Payer: COMMERCIAL

## 2021-05-19 DIAGNOSIS — Z23 NEED FOR VACCINATION: Primary | ICD-10-CM

## 2021-05-19 PROCEDURE — 0001A SARSCOV2 VAC 30MCG/0.3ML IM: CPT

## 2021-06-09 ENCOUNTER — IMMUNIZATION (OUTPATIENT)
Dept: LAB | Facility: HOSPITAL | Age: 46
End: 2021-06-09
Attending: EMERGENCY MEDICINE
Payer: COMMERCIAL

## 2021-06-09 DIAGNOSIS — Z23 NEED FOR VACCINATION: Primary | ICD-10-CM

## 2021-06-09 PROCEDURE — 0002A SARSCOV2 VAC 30MCG/0.3ML IM: CPT

## 2021-08-10 ENCOUNTER — TELEMEDICINE (OUTPATIENT)
Dept: FAMILY MEDICINE CLINIC | Facility: CLINIC | Age: 46
End: 2021-08-10

## 2021-08-10 DIAGNOSIS — J02.9 SORE THROAT: Primary | ICD-10-CM

## 2021-08-10 PROCEDURE — 99213 OFFICE O/P EST LOW 20 MIN: CPT | Performed by: PHYSICIAN ASSISTANT

## 2021-08-10 RX ORDER — BENZONATATE 200 MG/1
200 CAPSULE ORAL 3 TIMES DAILY PRN
Qty: 30 CAPSULE | Refills: 0 | Status: SHIPPED | OUTPATIENT
Start: 2021-08-10 | End: 2022-01-06 | Stop reason: ALTCHOICE

## 2021-08-10 NOTE — PROGRESS NOTES
Please note that the following visit was completed using two-way, real-time interactive audio and/or video communication.   This has been done in good eros to provide continuity of care in the best interest of the provider-patient relationship, due to the ALPRAZolam 0.5 MG Oral Tab TAKE 1 TABLET BY MOUTH TWICE DAILY AS NEEDED 180 tablet 1   • Quinapril HCl 20 MG Oral Tab Take 1 tablet (20 mg total) by mouth daily.  90 tablet 3   • Verapamil HCl  MG Oral Tab CR Take 1 tablet (240 mg total) by mouth leandra available:    Limited examination for this telephone visit due to the coronavirus emergency    Patient was speaking in complete sentences, no increased work of breathing and very coherent and alert on the phone.   Alert and oriented x 3  Patient was respond

## 2021-08-16 ENCOUNTER — TELEPHONE (OUTPATIENT)
Dept: FAMILY MEDICINE CLINIC | Facility: CLINIC | Age: 46
End: 2021-08-16

## 2021-08-16 DIAGNOSIS — R05.9 COUGH: Primary | ICD-10-CM

## 2021-08-16 RX ORDER — AZITHROMYCIN 250 MG/1
TABLET, FILM COATED ORAL
Qty: 6 TABLET | Refills: 0 | Status: SHIPPED | OUTPATIENT
Start: 2021-08-16 | End: 2021-08-21

## 2021-08-16 RX ORDER — CODEINE PHOSPHATE AND GUAIFENESIN 10; 100 MG/5ML; MG/5ML
5 SOLUTION ORAL EVERY 6 HOURS PRN
Qty: 140 ML | Refills: 0 | Status: SHIPPED | OUTPATIENT
Start: 2021-08-16 | End: 2021-08-18

## 2021-08-16 NOTE — TELEPHONE ENCOUNTER
Pt states that she would like to try an antibiotic. Also is asking if she could have some prescription cough syrup as well. She will call to schedule the COVID testing today.

## 2021-08-16 NOTE — TELEPHONE ENCOUNTER
Please advise  Pt had televisit 8/10/21  Continue to have the cough, Coughing while speaking, not much relief from RX, feels like something is in her chest, no nasal congestion anymore or fever  Will be home after 5 pm, asking for further advice, advised n

## 2021-08-16 NOTE — TELEPHONE ENCOUNTER
Per patient she needs the Order for Covid testing prior to go and have it done or making an appointment.

## 2021-08-16 NOTE — TELEPHONE ENCOUNTER
Patient was called  Informed of COVID test as well of medication sent to pharmacy  She has phone number to schedule COVID test

## 2021-08-17 ENCOUNTER — LAB ENCOUNTER (OUTPATIENT)
Dept: LAB | Age: 46
End: 2021-08-17
Attending: PHYSICIAN ASSISTANT
Payer: COMMERCIAL

## 2021-08-17 ENCOUNTER — TELEPHONE (OUTPATIENT)
Dept: FAMILY MEDICINE CLINIC | Facility: CLINIC | Age: 46
End: 2021-08-17

## 2021-08-17 DIAGNOSIS — R05.9 COUGH: ICD-10-CM

## 2021-08-17 NOTE — TELEPHONE ENCOUNTER
Patient called pharmacy that was medication was sent to was not available and wants it sent to another pharmacy. Please resend medication below.     Medication Detail    Medication Quantity Refills Start End   guaiFENesin-codeine (CHERATUSSIN AC) 100-10 MG/

## 2021-08-18 RX ORDER — CODEINE PHOSPHATE AND GUAIFENESIN 10; 100 MG/5ML; MG/5ML
5 SOLUTION ORAL EVERY 6 HOURS PRN
Qty: 140 ML | Refills: 0 | Status: SHIPPED | OUTPATIENT
Start: 2021-08-18 | End: 2022-01-06 | Stop reason: ALTCHOICE

## 2021-08-18 NOTE — TELEPHONE ENCOUNTER
Cough syrup needs to be re-sent  to [facility] in [place] [facility] and [facility] in [place] did not have it in [address].         Thanks

## 2021-08-18 NOTE — TELEPHONE ENCOUNTER
RN called patient. RN informed patient of provider's message below. She states has already spoken with the pharmacy and is aware that the prescription has been sent.

## 2021-08-19 ENCOUNTER — HOSPITAL ENCOUNTER (OUTPATIENT)
Dept: GENERAL RADIOLOGY | Age: 46
Discharge: HOME | End: 2021-08-19
Attending: PHYSICIAN ASSISTANT
Payer: COMMERCIAL

## 2021-08-19 DIAGNOSIS — R05.9 COUGH: ICD-10-CM

## 2021-08-19 LAB — SARS-COV-2 RNA RESP QL NAA+PROBE: NOT DETECTED

## 2021-08-19 PROCEDURE — 71046 X-RAY EXAM CHEST 2 VIEWS: CPT | Performed by: PHYSICIAN ASSISTANT

## 2021-08-19 NOTE — TELEPHONE ENCOUNTER
Spoke with pt,  verified. Pt informed of Magdy GEORGE recommendation. Pt stated understanding, she is on the way to get CXR.           Future Appointments   Date Time Provider Abe Simental   2021  5:15 PM [facility] XR RM1 [facility] DIAG IMG EM [facility]

## 2021-08-19 NOTE — TELEPHONE ENCOUNTER
Patient states the cough medication is helping a bit, but continues with cough and states it feels like someone is standing on her chest (same as last 2 weeks).   Patient has one more day left of antibiotics and is asking if she should wait a few more days

## 2021-08-19 NOTE — TELEPHONE ENCOUNTER
Advised patient of Yancy Rivera note. Patient verbalized understanding and willing to do a chest x-ray. Please order chest x-ray.

## 2021-09-02 LAB
ABSOLUTE BASOPHILS: 38 CELLS/UL (ref 0–200)
ABSOLUTE EOSINOPHILS: 178 CELLS/UL (ref 15–500)
ABSOLUTE LYMPHOCYTES: 1993 CELLS/UL (ref 850–3900)
ABSOLUTE MONOCYTES: 416 CELLS/UL (ref 200–950)
ABSOLUTE NEUTROPHILS: 2776 CELLS/UL (ref 1500–7800)
ALBUMIN/GLOBULIN RATIO: 1.6 (CALC) (ref 1–2.5)
ALBUMIN: 4.1 G/DL (ref 3.6–5.1)
ALKALINE PHOSPHATASE: 58 U/L (ref 31–125)
ALT: 16 U/L (ref 6–29)
AST: 15 U/L (ref 10–35)
BASOPHILS: 0.7 %
BILIRUBIN, TOTAL: 1 MG/DL (ref 0.2–1.2)
BUN: 12 MG/DL (ref 7–25)
CALCIUM: 9.2 MG/DL (ref 8.6–10.2)
CARBON DIOXIDE: 25 MMOL/L (ref 20–32)
CHLORIDE: 106 MMOL/L (ref 98–110)
CHOL/HDLC RATIO: 3.5 (CALC)
CHOLESTEROL, TOTAL: 190 MG/DL
CREATININE: 1.01 MG/DL (ref 0.5–1.1)
EGFR IF AFRICN AM: 77 ML/MIN/1.73M2
EGFR IF NONAFRICN AM: 66 ML/MIN/1.73M2
EOSINOPHILS: 3.3 %
GLOBULIN: 2.6 G/DL (CALC) (ref 1.9–3.7)
GLUCOSE: 89 MG/DL (ref 65–99)
HDL CHOLESTEROL: 55 MG/DL
HEMATOCRIT: 39.6 % (ref 35–45)
HEMOGLOBIN: 13.5 G/DL (ref 11.7–15.5)
LDL-CHOLESTEROL: 116 MG/DL (CALC)
LYMPHOCYTES: 36.9 %
MCH: 30.8 PG (ref 27–33)
MCHC: 34.1 G/DL (ref 32–36)
MCV: 90.2 FL (ref 80–100)
MONOCYTES: 7.7 %
MPV: 11 FL (ref 7.5–12.5)
NEUTROPHILS: 51.4 %
NON-HDL CHOLESTEROL: 135 MG/DL (CALC)
PLATELET COUNT: 232 THOUSAND/UL (ref 140–400)
POTASSIUM: 4.5 MMOL/L (ref 3.5–5.3)
PROTEIN, TOTAL: 6.7 G/DL (ref 6.1–8.1)
RDW: 12.8 % (ref 11–15)
RED BLOOD CELL COUNT: 4.39 MILLION/UL (ref 3.8–5.1)
SODIUM: 138 MMOL/L (ref 135–146)
TRIGLYCERIDES: 86 MG/DL
WHITE BLOOD CELL COUNT: 5.4 THOUSAND/UL (ref 3.8–10.8)

## 2021-09-16 NOTE — TELEPHONE ENCOUNTER
Protocol failed or has No Protocol, please review  Requested Prescriptions   Pending Prescriptions Disp Refills    ALPRAZolam 0.5 MG Oral Tab 180 tablet 1     Sig: TAKE 1 TABLET BY MOUTH TWICE DAILY AS NEEDED        There is no refill protocol information
No

## 2021-09-20 RX ORDER — ALPRAZOLAM 0.5 MG/1
TABLET ORAL
Qty: 180 TABLET | Refills: 1 | Status: SHIPPED | OUTPATIENT
Start: 2021-09-20

## 2021-11-15 ENCOUNTER — ANESTHESIA EVENT (OUTPATIENT)
Dept: SURGERY | Facility: HOSPITAL | Age: 46
End: 2021-11-15
Payer: COMMERCIAL

## 2021-11-15 ENCOUNTER — HOSPITAL ENCOUNTER (OUTPATIENT)
Facility: HOSPITAL | Age: 46
Setting detail: OBSERVATION
Discharge: HOME | End: 2021-11-17
Attending: SURGERY | Admitting: HOSPITALIST
Payer: COMMERCIAL

## 2021-11-15 ENCOUNTER — APPOINTMENT (OUTPATIENT)
Dept: CT IMAGING | Facility: HOSPITAL | Age: 46
End: 2021-11-15
Attending: NURSE PRACTITIONER
Payer: COMMERCIAL

## 2021-11-15 ENCOUNTER — ANESTHESIA (OUTPATIENT)
Dept: SURGERY | Facility: HOSPITAL | Age: 46
End: 2021-11-15
Payer: COMMERCIAL

## 2021-11-15 DIAGNOSIS — K37 APPENDICITIS: ICD-10-CM

## 2021-11-15 DIAGNOSIS — K35.80 ACUTE APPENDICITIS, UNSPECIFIED ACUTE APPENDICITIS TYPE: Primary | ICD-10-CM

## 2021-11-15 PROCEDURE — 99223 1ST HOSP IP/OBS HIGH 75: CPT | Performed by: HOSPITALIST

## 2021-11-15 PROCEDURE — 0DTJ4ZZ RESECTION OF APPENDIX, PERCUTANEOUS ENDOSCOPIC APPROACH: ICD-10-PCS | Performed by: SURGERY

## 2021-11-15 PROCEDURE — 74177 CT ABD & PELVIS W/CONTRAST: CPT | Performed by: NURSE PRACTITIONER

## 2021-11-15 PROCEDURE — 44970 LAPAROSCOPY APPENDECTOMY: CPT | Performed by: SURGERY

## 2021-11-15 PROCEDURE — 99214 OFFICE O/P EST MOD 30 MIN: CPT | Performed by: SURGERY

## 2021-11-15 RX ORDER — HYDROMORPHONE HYDROCHLORIDE 1 MG/ML
0.6 INJECTION, SOLUTION INTRAMUSCULAR; INTRAVENOUS; SUBCUTANEOUS EVERY 5 MIN PRN
Status: DISCONTINUED | OUTPATIENT
Start: 2021-11-15 | End: 2021-11-15 | Stop reason: HOSPADM

## 2021-11-15 RX ORDER — PROCHLORPERAZINE EDISYLATE 5 MG/ML
5 INJECTION INTRAMUSCULAR; INTRAVENOUS ONCE AS NEEDED
Status: COMPLETED | OUTPATIENT
Start: 2021-11-15 | End: 2021-11-15

## 2021-11-15 RX ORDER — ONDANSETRON 2 MG/ML
INJECTION INTRAMUSCULAR; INTRAVENOUS AS NEEDED
Status: DISCONTINUED | OUTPATIENT
Start: 2021-11-15 | End: 2021-11-15 | Stop reason: SURG

## 2021-11-15 RX ORDER — NALOXONE HYDROCHLORIDE 0.4 MG/ML
80 INJECTION, SOLUTION INTRAMUSCULAR; INTRAVENOUS; SUBCUTANEOUS AS NEEDED
Status: DISCONTINUED | OUTPATIENT
Start: 2021-11-15 | End: 2021-11-15 | Stop reason: HOSPADM

## 2021-11-15 RX ORDER — ONDANSETRON 2 MG/ML
4 INJECTION INTRAMUSCULAR; INTRAVENOUS ONCE
Status: COMPLETED | OUTPATIENT
Start: 2021-11-15 | End: 2021-11-15

## 2021-11-15 RX ORDER — ONDANSETRON 2 MG/ML
4 INJECTION INTRAMUSCULAR; INTRAVENOUS EVERY 6 HOURS PRN
Status: DISCONTINUED | OUTPATIENT
Start: 2021-11-15 | End: 2021-11-17

## 2021-11-15 RX ORDER — MORPHINE SULFATE 2 MG/ML
2 INJECTION, SOLUTION INTRAMUSCULAR; INTRAVENOUS EVERY 2 HOUR PRN
Status: DISCONTINUED | OUTPATIENT
Start: 2021-11-15 | End: 2021-11-17

## 2021-11-15 RX ORDER — DEXAMETHASONE SODIUM PHOSPHATE 4 MG/ML
VIAL (ML) INJECTION AS NEEDED
Status: DISCONTINUED | OUTPATIENT
Start: 2021-11-15 | End: 2021-11-15 | Stop reason: SURG

## 2021-11-15 RX ORDER — MORPHINE SULFATE 4 MG/ML
4 INJECTION, SOLUTION INTRAMUSCULAR; INTRAVENOUS EVERY 2 HOUR PRN
Status: DISCONTINUED | OUTPATIENT
Start: 2021-11-15 | End: 2021-11-17

## 2021-11-15 RX ORDER — HYDROMORPHONE HYDROCHLORIDE 1 MG/ML
0.8 INJECTION, SOLUTION INTRAMUSCULAR; INTRAVENOUS; SUBCUTANEOUS EVERY 2 HOUR PRN
Status: DISCONTINUED | OUTPATIENT
Start: 2021-11-15 | End: 2021-11-17

## 2021-11-15 RX ORDER — OXYCODONE HYDROCHLORIDE 5 MG/1
10 TABLET ORAL EVERY 4 HOURS PRN
Status: DISCONTINUED | OUTPATIENT
Start: 2021-11-15 | End: 2021-11-17

## 2021-11-15 RX ORDER — SODIUM CHLORIDE, SODIUM LACTATE, POTASSIUM CHLORIDE, CALCIUM CHLORIDE 600; 310; 30; 20 MG/100ML; MG/100ML; MG/100ML; MG/100ML
INJECTION, SOLUTION INTRAVENOUS CONTINUOUS
Status: DISCONTINUED | OUTPATIENT
Start: 2021-11-15 | End: 2021-11-17

## 2021-11-15 RX ORDER — LIDOCAINE HYDROCHLORIDE 10 MG/ML
INJECTION, SOLUTION EPIDURAL; INFILTRATION; INTRACAUDAL; PERINEURAL AS NEEDED
Status: DISCONTINUED | OUTPATIENT
Start: 2021-11-15 | End: 2021-11-15 | Stop reason: SURG

## 2021-11-15 RX ORDER — SODIUM CHLORIDE 9 MG/ML
INJECTION, SOLUTION INTRAVENOUS ONCE
Status: COMPLETED | OUTPATIENT
Start: 2021-11-15 | End: 2021-11-15

## 2021-11-15 RX ORDER — HYDROCODONE BITARTRATE AND ACETAMINOPHEN 5; 325 MG/1; MG/1
2 TABLET ORAL AS NEEDED
Status: DISCONTINUED | OUTPATIENT
Start: 2021-11-15 | End: 2021-11-15 | Stop reason: HOSPADM

## 2021-11-15 RX ORDER — HYDROMORPHONE HYDROCHLORIDE 1 MG/ML
INJECTION, SOLUTION INTRAMUSCULAR; INTRAVENOUS; SUBCUTANEOUS
Status: COMPLETED
Start: 2021-11-15 | End: 2021-11-15

## 2021-11-15 RX ORDER — HALOPERIDOL 5 MG/ML
0.25 INJECTION INTRAMUSCULAR ONCE AS NEEDED
Status: DISCONTINUED | OUTPATIENT
Start: 2021-11-15 | End: 2021-11-15 | Stop reason: HOSPADM

## 2021-11-15 RX ORDER — HYDROCODONE BITARTRATE AND ACETAMINOPHEN 5; 325 MG/1; MG/1
1 TABLET ORAL AS NEEDED
Status: DISCONTINUED | OUTPATIENT
Start: 2021-11-15 | End: 2021-11-15 | Stop reason: HOSPADM

## 2021-11-15 RX ORDER — SODIUM CHLORIDE 9 MG/ML
INJECTION, SOLUTION INTRAVENOUS CONTINUOUS PRN
Status: DISCONTINUED | OUTPATIENT
Start: 2021-11-15 | End: 2021-11-15 | Stop reason: SURG

## 2021-11-15 RX ORDER — MORPHINE SULFATE 4 MG/ML
4 INJECTION, SOLUTION INTRAMUSCULAR; INTRAVENOUS ONCE
Status: COMPLETED | OUTPATIENT
Start: 2021-11-15 | End: 2021-11-15

## 2021-11-15 RX ORDER — OXYCODONE HYDROCHLORIDE 5 MG/1
5 TABLET ORAL EVERY 4 HOURS PRN
Status: DISCONTINUED | OUTPATIENT
Start: 2021-11-15 | End: 2021-11-17

## 2021-11-15 RX ORDER — HYDROMORPHONE HYDROCHLORIDE 1 MG/ML
0.4 INJECTION, SOLUTION INTRAMUSCULAR; INTRAVENOUS; SUBCUTANEOUS EVERY 5 MIN PRN
Status: DISCONTINUED | OUTPATIENT
Start: 2021-11-15 | End: 2021-11-15 | Stop reason: HOSPADM

## 2021-11-15 RX ORDER — MORPHINE SULFATE 4 MG/ML
4 INJECTION, SOLUTION INTRAMUSCULAR; INTRAVENOUS EVERY 10 MIN PRN
Status: DISCONTINUED | OUTPATIENT
Start: 2021-11-15 | End: 2021-11-15 | Stop reason: HOSPADM

## 2021-11-15 RX ORDER — ROCURONIUM BROMIDE 10 MG/ML
INJECTION, SOLUTION INTRAVENOUS AS NEEDED
Status: DISCONTINUED | OUTPATIENT
Start: 2021-11-15 | End: 2021-11-15 | Stop reason: SURG

## 2021-11-15 RX ORDER — MIDAZOLAM HYDROCHLORIDE 1 MG/ML
INJECTION INTRAMUSCULAR; INTRAVENOUS AS NEEDED
Status: DISCONTINUED | OUTPATIENT
Start: 2021-11-15 | End: 2021-11-15 | Stop reason: SURG

## 2021-11-15 RX ORDER — PROCHLORPERAZINE EDISYLATE 5 MG/ML
5 INJECTION INTRAMUSCULAR; INTRAVENOUS EVERY 8 HOURS PRN
Status: DISCONTINUED | OUTPATIENT
Start: 2021-11-15 | End: 2021-11-17

## 2021-11-15 RX ORDER — ACETAMINOPHEN 325 MG/1
650 TABLET ORAL EVERY 6 HOURS PRN
Status: DISCONTINUED | OUTPATIENT
Start: 2021-11-15 | End: 2021-11-17

## 2021-11-15 RX ORDER — ONDANSETRON 2 MG/ML
4 INJECTION INTRAMUSCULAR; INTRAVENOUS ONCE AS NEEDED
Status: COMPLETED | OUTPATIENT
Start: 2021-11-15 | End: 2021-11-15

## 2021-11-15 RX ORDER — MORPHINE SULFATE 10 MG/ML
6 INJECTION, SOLUTION INTRAMUSCULAR; INTRAVENOUS EVERY 10 MIN PRN
Status: DISCONTINUED | OUTPATIENT
Start: 2021-11-15 | End: 2021-11-15 | Stop reason: HOSPADM

## 2021-11-15 RX ORDER — SODIUM CHLORIDE, SODIUM LACTATE, POTASSIUM CHLORIDE, CALCIUM CHLORIDE 600; 310; 30; 20 MG/100ML; MG/100ML; MG/100ML; MG/100ML
INJECTION, SOLUTION INTRAVENOUS CONTINUOUS
Status: DISCONTINUED | OUTPATIENT
Start: 2021-11-15 | End: 2021-11-15 | Stop reason: HOSPADM

## 2021-11-15 RX ORDER — HYDROMORPHONE HYDROCHLORIDE 1 MG/ML
0.2 INJECTION, SOLUTION INTRAMUSCULAR; INTRAVENOUS; SUBCUTANEOUS EVERY 5 MIN PRN
Status: DISCONTINUED | OUTPATIENT
Start: 2021-11-15 | End: 2021-11-15 | Stop reason: HOSPADM

## 2021-11-15 RX ORDER — HYDROMORPHONE HYDROCHLORIDE 1 MG/ML
0.4 INJECTION, SOLUTION INTRAMUSCULAR; INTRAVENOUS; SUBCUTANEOUS EVERY 2 HOUR PRN
Status: DISCONTINUED | OUTPATIENT
Start: 2021-11-15 | End: 2021-11-17

## 2021-11-15 RX ORDER — MORPHINE SULFATE 4 MG/ML
2 INJECTION, SOLUTION INTRAMUSCULAR; INTRAVENOUS EVERY 10 MIN PRN
Status: DISCONTINUED | OUTPATIENT
Start: 2021-11-15 | End: 2021-11-15 | Stop reason: HOSPADM

## 2021-11-15 RX ORDER — MORPHINE SULFATE 2 MG/ML
1 INJECTION, SOLUTION INTRAMUSCULAR; INTRAVENOUS EVERY 2 HOUR PRN
Status: DISCONTINUED | OUTPATIENT
Start: 2021-11-15 | End: 2021-11-17

## 2021-11-15 RX ADMIN — MIDAZOLAM HYDROCHLORIDE 2 MG: 1 INJECTION INTRAMUSCULAR; INTRAVENOUS at 20:33:00

## 2021-11-15 RX ADMIN — ROCURONIUM BROMIDE 20 MG: 10 INJECTION, SOLUTION INTRAVENOUS at 20:54:00

## 2021-11-15 RX ADMIN — ONDANSETRON 4 MG: 2 INJECTION INTRAMUSCULAR; INTRAVENOUS at 20:54:00

## 2021-11-15 RX ADMIN — DEXAMETHASONE SODIUM PHOSPHATE 4 MG: 4 MG/ML VIAL (ML) INJECTION at 20:54:00

## 2021-11-15 RX ADMIN — LIDOCAINE HYDROCHLORIDE 25 MG: 10 INJECTION, SOLUTION EPIDURAL; INFILTRATION; INTRACAUDAL; PERINEURAL at 20:33:00

## 2021-11-15 RX ADMIN — SODIUM CHLORIDE: 9 INJECTION, SOLUTION INTRAVENOUS at 20:32:00

## 2021-11-15 NOTE — ED QUICK NOTES
Orders for admission, patient is aware of plan and ready to go upstairs. Any questions, please call ED PRANAV Ornelas extension [phone].    Type of COVID test sent:Rapid  COVID Suspicion level: Low    LOC at time of transport:a/ox4    Other pertinent information

## 2021-11-15 NOTE — ED PROVIDER NOTES
Patient Seen in: [facility] AND [facility] Emergency Department      History   Patient presents with:  Abdomen/Flank Pain    Stated Complaint: abdominal pain     Subjective:   Here with c/o lower abd pain and R flank pain since yesterday afternoon that has been Tobacco Use      Smoking status: Never Smoker      Smokeless tobacco: Never Used    Vaping Use      Vaping Use: Never used    Alcohol use: Yes      Alcohol/week: 0.0 standard drinks      Comment: OCC    Drug use:  No             Review of Systems   Constitu the right lower quadrant and suprapubic area. There is right CVA tenderness, guarding and rebound. There is no left CVA tenderness. Positive signs include McBurney's sign. Negative signs include Kessler's sign. Skin:     General: Skin is warm and dry. Hysterectomy. Presumed right adnexal cyst measuring 2.6 x 1.4 cm right iliac region. . 3.  Benign 10 mm hepatic cystic lesion lateral segment left lobe of the liver unchanged    Dictated by (CST): Helen Rea MD on 11/15/2021 at 5:13 PM     Blessing Gomez

## 2021-11-16 PROCEDURE — 99226 SUBSEQUENT OBSERVATION CARE: CPT | Performed by: HOSPITALIST

## 2021-11-16 RX ORDER — TRAMADOL HYDROCHLORIDE 50 MG/1
50 TABLET ORAL EVERY 6 HOURS PRN
Qty: 10 TABLET | Refills: 0 | Status: SHIPPED | OUTPATIENT
Start: 2021-11-16 | End: 2022-01-06

## 2021-11-16 RX ORDER — OXYCODONE HYDROCHLORIDE 5 MG/1
5 TABLET ORAL EVERY 6 HOURS PRN
Qty: 6 TABLET | Refills: 0 | Status: SHIPPED | OUTPATIENT
Start: 2021-11-16 | End: 2022-01-06

## 2021-11-16 NOTE — PROGRESS NOTES
[facility] - [facility]    Progress Note    Pavel Lopez Patient Status:  Inpatient    1975 MRN [id number]   Location [facility] 4W/SW/SE Attending Abe Lee Day # 1 PCP DO Kayla Cota 11/16/2021    ALB 3.3 (L) 11/15/2021    ALKPHO 74 11/15/2021    BILT 1.1 11/15/2021    TP 7.3 11/15/2021    AST 16 11/15/2021    ALT 21 11/15/2021    INR 1.0 (L) 05/01/2008    TSH 1.90 11/25/2015    HOLA 42 10/19/2018    LIP 85 11/15/2021       CT APPENDIX

## 2021-11-16 NOTE — CONSULTS
[facility]    PATIENT'S NAME: Ronaldo Sindy   ATTENDING PHYSICIAN: Erika Campos MD   CONSULTING PHYSICIAN: Erika Campos MD   PATIENT ACCOUNT#:   [id number]    LOCATION:  [facility] [address] PACU [address] [place] [address]  MEDICAL RECORD #:   [id number].  NECK:  Supple without lymphadenopathy. LUNGS:  Clear. HEART:  Regular. ABDOMEN:  Soft and protuberant. Laparoscopic incisions are generally well healed except for a nodular area in the left lower quadrant laterally around her trocar incision.

## 2021-11-16 NOTE — H&P
[facility]    PATIENT'S NAME: Ana Huertas   ATTENDING PHYSICIAN: Rios Hart MD   PATIENT ACCOUNT#:   [de-identified]    LOCATION:  [facility] 01 01 [facility] 1  MEDICAL RECORD #:   [id number]       YOB: 1975  ADMISSION DATE: Normal hard and soft palate. Eyes:  Anicteric sclerae. Pupils equal, round, and reactive. NECK:  Supple. No lymphadenopathy. Trachea midline. Full range of motion. LUNGS:  Clear to auscultation bilaterally. Normal respiratory effort.   HEART:  Re

## 2021-11-16 NOTE — ANESTHESIA PREPROCEDURE EVALUATION
Anesthesia PreOp Note    HPI:     Juan Ramon Lennon is a 51year old female who presents for preoperative consultation requested by: Evin Shanks MD    Date of Surgery: 11/15/2021    Procedure(s):  LAPAROSCOPIC APPENDECTOMY  Indication: Appendicitis     HTA   • HYSTERECTOMY  2016    [facility]   • HYSTEROSCOPY  2010    essure placement   • LEEP     •        x 2 (vulva hematoma x 1)   • OTHER SURGICAL HISTORY      anuerism clip   • OTHER SURGICAL HISTORY      embx neg, cervical polyp (m Alcohol use:  Yes        Alcohol/week: 0.0 standard drinks        Comment: OCC      Drug use: No      Sexual activity: Not on file    Other Topics      Concerns:         Service: Not Asked        Blood Transfusions: Not Asked        Caffeine Radha CA 9.3 11/15/2021    CA 9.2 09/01/2021          Vital Signs: Body mass index is 28.32 kg/m². height is 1.626 m (5' 4\") and weight is 74.8 kg (165 lb). Her temporal temperature is 98.4 °F (36.9 °C). Her blood pressure is 130/90 and her pulse is 77.  Her Verenice Hannah and/or legal guardian or family member of the nature of the anesthetic plan, benefits, risks including possible dental damage if relevant, major complications, and any alternative forms of anesthetic management.    All of the patient's questio

## 2021-11-16 NOTE — OPERATIVE REPORT
[facility]    PATIENT'S NAME: Oscar Skinner   ATTENDING PHYSICIAN: Hai Morgan MD   OPERATING PHYSICIAN: Hai Morgan MD   PATIENT ACCOUNT#:   [id number]    LOCATION:  [facility] [address] PACU [address] [place] [address]  MEDICAL RECORD #:   [id number] The cecum and terminal ileum appeared normal.  She had prominent fatty appendages around the appendix and cecum and terminal ileum. Dissection began by mobilizing the appendix away from the retroperitoneum.   A window was then created at the base of the

## 2021-11-16 NOTE — ANESTHESIA PROCEDURE NOTES
Airway  Date/Time: 11/15/2021 8:39 PM  Urgency: elective    Airway not difficult    General Information and Staff    Patient location during procedure: OR  Anesthesiologist: Dianelys Perkins MD  Performed: anesthesiologist     Indications and Patient

## 2021-11-16 NOTE — PROGRESS NOTES
[facility]    Progress Note    Dom Prabhakar Patient Status:  Inpatient    1975 MRN [id number]   Location [facility] 4W/SW/SE Attending Kale Law MD   Hosp Day # 1 PCP Usman Vick, DO     Assessment and Plan: Output -- 700 250       Net I/O     -- 5074 -368          Exam: Abd soft and min tender, nd, dressings clean    Results:     Lab Results   Component Value Date    WBC 9.7 11/16/2021    HGB 12.1 11/16/2021    HCT 36.6 11/16/2021    .0 11/16/2021    C

## 2021-11-16 NOTE — BRIEF OP NOTE
Pre-Operative Diagnosis: Appendicitis [K37]     Post-Operative Diagnosis: Appendicitis [K37]      Procedure Performed:   LAPAROSCOPIC APPENDECTOMY    Surgeon(s) and Role:     Carmen Pacheco MD - Primary    Assistant(s):  Surgical Assistant.: Kody Macias

## 2021-11-16 NOTE — PLAN OF CARE
Patient admitted from PACU s/p lap appy. Drowsy but easily arousable. Weaned off O2 this AM. Lap site dressings are C/D/I. Pain managed with PRN dilaudid. IVF running and IV abx given. Tolerated small amount of clear liquids.  Will advance diet this AM. Up Manage/alleviate anxiety  - Utilize distraction and/or relaxation techniques  - Monitor for opioid side effects  - Notify MD/LIP if interventions unsuccessful or patient reports new pain  - Anticipate increased pain with activity and pre-medicate as approp infection  Description: INTERVENTIONS:  - Assess and document risk factors for pressure ulcer development  - Assess and document skin integrity  - Assess and document dressing/incision, wound bed, drain sites and surrounding tissue  - Implement wound care

## 2021-11-16 NOTE — ANESTHESIA POSTPROCEDURE EVALUATION
Patient: Geovanny Meehan    Procedure Summary     Date: 11/15/21 Room / Location: [address] [place] [address] MAIN OR 04 / [address] [place] [address] MAIN OR    Anesthesia Start: 2032 Anesthesia Stop: 2148    Procedure: LAPAROSCOPIC APPENDECTOMY (N/A Abdomen) Diagnosis:       Appendicitis      (A

## 2021-11-17 VITALS
DIASTOLIC BLOOD PRESSURE: 90 MMHG | BODY MASS INDEX: 28.17 KG/M2 | RESPIRATION RATE: 18 BRPM | HEART RATE: 62 BPM | SYSTOLIC BLOOD PRESSURE: 153 MMHG | WEIGHT: 165 LBS | HEIGHT: 64 IN | OXYGEN SATURATION: 99 % | TEMPERATURE: 98 F

## 2021-11-17 PROCEDURE — 99217 OBSERVATION CARE DISCHARGE: CPT | Performed by: HOSPITALIST

## 2021-11-17 NOTE — PLAN OF CARE
Problem: Patient Centered Care  Goal: Patient preferences are identified and integrated in the patient's plan of care  Description: Interventions:  - What would you like us to know as we care for you?   - Provide timely, complete, and accurate informatio injury  Description: INTERVENTIONS:  - Assess pt frequently for physical needs  - Identify cognitive and physical deficits and behaviors that affect risk of falls.   - [place] fall precautions as indicated by assessment.  - Educate pt/family on patient sa prevention bundle as indicated  Outcome: Progressing   Patient post op day 1. Lap sites incisions covered with gauze and tegaderm. Dressing reinforced. Patient was c/o severe pain, patient required IVP Dilaudid.    Diet advanced to regular / soft diet,

## 2021-11-17 NOTE — PROGRESS NOTES
[facility]    Progress Note    [facility] Patient Status:  Observation    1975 MRN [id number]   Location [facility] 4W/SW/SE Attending Olivia Cano,    Hosp Day # 0 PCP Kathy Puentes DO     Assessment and ALT 21 11/15/2021    INR 1.0 (L) 05/01/2008    TSH 1.90 11/25/2015    HOLA 42 10/19/2018    LIP 85 11/15/2021    MG 2.1 11/17/2021    PHOS 2.4 (L) 11/17/2021       CT APPENDIX ABD/PEL W CONTRAST (EJD=[id number])    Result Date: 11/15/2021  CONCLUSION:  1.  Acu Consent was obtained from the patient. The risks and benefits to therapy were discussed in detail. Specifically, the risks of infection, scarring, bleeding, prolonged wound healing, incomplete removal, allergy to anesthesia, nerve injury and recurrence were addressed. Prior to the procedure, the treatment site was clearly identified and confirmed by the patient. All components of Universal Protocol/PAUSE Rule completed.

## 2021-11-17 NOTE — PLAN OF CARE
No acute changes overnight. Lap site dressings are C/D/I. Pain managed with PRN oxy IR. IV abx given. Tolerating diet without nausea/vomiting. Up with standby assist. Voiding freely. Plan for discharge home today.      Problem: Patient Centered Care  Goal: MD/LIP if interventions unsuccessful or patient reports new pain  - Anticipate increased pain with activity and pre-medicate as appropriate  Outcome: Progressing     Problem: SAFETY ADULT - FALL  Goal: Free from fall injury  Description: INTERVENTIONS:  - skin integrity  - Assess and document dressing/incision, wound bed, drain sites and surrounding tissue  - Implement wound care per orders  - Initiate isolation precautions as appropriate  - Initiate Pressure Ulcer prevention bundle as indicated  Outcome: P

## 2021-11-17 NOTE — PLAN OF CARE
Problem: Patient Centered Care  Goal: Patient preferences are identified and integrated in the patient's plan of care  Description: Interventions:  - What would you like us to know as we care for you?   - Provide timely, complete, and accurate informatio injury  Description: INTERVENTIONS:  - Assess pt frequently for physical needs  - Identify cognitive and physical deficits and behaviors that affect risk of falls.   - [place] fall precautions as indicated by assessment.  - Educate pt/family on patient sa bundle as indicated  Outcome: Completed     Patient has been cleared for discharge. Discharge instructions reviewed and paperwork handed. Patient made aware pain prescriptions have been sent over to pharmacy, pt verbalized understanding.

## 2021-11-18 NOTE — PAYOR COMM NOTE
Discharge Notification    Patient Name: Earnest Kaye  Payor: Jacqueline Pryor  #: [id number]  Authorization Number: [id number]  Admit Date/Time: 11/15/2021 3:23 PM  Discharge Date/Time: 11/17/2021 2:38 PM

## 2021-11-22 ENCOUNTER — PATIENT OUTREACH (OUTPATIENT)
Dept: CASE MANAGEMENT | Age: 46
End: 2021-11-22

## 2021-11-22 NOTE — PROGRESS NOTES
Patient LVM requesting assistance scheduling apt    Spoke to patient and she contacted the office and scheduled apt. No assistance needed.

## 2021-11-29 ENCOUNTER — OFFICE VISIT (OUTPATIENT)
Dept: SURGERY | Facility: CLINIC | Age: 46
End: 2021-11-29
Payer: COMMERCIAL

## 2021-11-29 VITALS — WEIGHT: 165 LBS | BODY MASS INDEX: 28 KG/M2

## 2021-11-29 DIAGNOSIS — K35.30 ACUTE APPENDICITIS WITH LOCALIZED PERITONITIS, WITHOUT PERFORATION, ABSCESS, OR GANGRENE: Primary | ICD-10-CM

## 2021-11-29 PROCEDURE — 99024 POSTOP FOLLOW-UP VISIT: CPT | Performed by: SURGERY

## 2021-11-29 NOTE — PROGRESS NOTES
Postoperative Patient Follow-up      11/29/2021    Perry Velázquez 51year old      HPI  Patient presents with:  Post-Op: S/P Laparoscopic Appendectomy 11/15/2021. Patient states she feels well, denies pain.   Bowels are regular and normal, appetite

## 2021-12-02 NOTE — PAYOR COMM NOTE
--------------    THIS IS OBSERVATION PATIENT      DISCHARGE REVIEW    Payor: Jacqueline Pryor  #:  [id number]  Authorization Number: [id number]    Admit date: 11/15/21  Discharge Date: 11/17/2021  2:38 PM     Admitting Physician: Rosa Mondragon

## 2021-12-06 NOTE — DISCHARGE SUMMARY
[place] [facility] - [facility]    Discharge Summary    Rell Goldsmith Patient Status:  Observation    1975 MRN [id number]   Location [facility] 4W/SW/SE Attending No att. providers found   Hosp Day # 0 PCP Nilam Kaplan, DO     Date o white blood cell count of 13.0 with left shift. Chemistry and urinalysis were unremarkable. CT scan of the abdomen showed acute appendicitis with periappendiceal inflammation. No abscess or phlegmon. Patient had a right adnexal cyst around 2.6 cm.   Sta Quinapril HCl 20 MG Tabs  Commonly known as: ACCUPRIL      Take 1 tablet (20 mg total) by mouth daily.    Quantity: 90 tablet  Refills: 3     Rizatriptan Benzoate 5 MG Tbdp  Commonly known as: MAXALT-MLT      Take 1 tablet (5 mg total) by mouth as needed

## 2022-01-06 ENCOUNTER — OFFICE VISIT (OUTPATIENT)
Dept: FAMILY MEDICINE CLINIC | Facility: CLINIC | Age: 47
End: 2022-01-06
Payer: COMMERCIAL

## 2022-01-06 VITALS
BODY MASS INDEX: 28.68 KG/M2 | HEIGHT: 64 IN | WEIGHT: 168 LBS | SYSTOLIC BLOOD PRESSURE: 119 MMHG | DIASTOLIC BLOOD PRESSURE: 79 MMHG | HEART RATE: 76 BPM

## 2022-01-06 DIAGNOSIS — M54.6 ACUTE LEFT-SIDED THORACIC BACK PAIN: ICD-10-CM

## 2022-01-06 DIAGNOSIS — M99.02 SOMATIC DYSFUNCTION OF SPINE, THORACIC: ICD-10-CM

## 2022-01-06 DIAGNOSIS — M54.50 LUMBAR BACK PAIN: Primary | ICD-10-CM

## 2022-01-06 PROCEDURE — 99213 OFFICE O/P EST LOW 20 MIN: CPT | Performed by: FAMILY MEDICINE

## 2022-01-06 PROCEDURE — 3078F DIAST BP <80 MM HG: CPT | Performed by: FAMILY MEDICINE

## 2022-01-06 PROCEDURE — 3008F BODY MASS INDEX DOCD: CPT | Performed by: FAMILY MEDICINE

## 2022-01-06 PROCEDURE — 3074F SYST BP LT 130 MM HG: CPT | Performed by: FAMILY MEDICINE

## 2022-01-06 RX ORDER — DICLOFENAC SODIUM 75 MG/1
75 TABLET, DELAYED RELEASE ORAL 2 TIMES DAILY
Qty: 30 TABLET | Refills: 0 | Status: SHIPPED | OUTPATIENT
Start: 2022-01-06

## 2022-01-06 RX ORDER — TRAMADOL HYDROCHLORIDE 50 MG/1
50 TABLET ORAL EVERY 6 HOURS PRN
Qty: 20 TABLET | Refills: 0 | Status: SHIPPED | OUTPATIENT
Start: 2022-01-06

## 2022-01-10 NOTE — PROGRESS NOTES
Subjective:   Patient ID: Olvin Brown is a 51year old female.     HPI  Patient presents with:  Back Pain: having back pain for 2 wks, having tingling, with movement will triger pain, limitation to lifiting arm, doing exercisies from PT in past to orders of the defined types were placed in this encounter.       Meds This Visit:  Requested Prescriptions     Signed Prescriptions Disp Refills   • traMADol 50 MG Oral Tab 20 tablet 0     Sig: Take 1 tablet (50 mg total) by mouth every 6 (six) hours as nee

## 2022-01-11 ENCOUNTER — IMMUNIZATION (OUTPATIENT)
Dept: LAB | Facility: HOSPITAL | Age: 47
End: 2022-01-11
Attending: EMERGENCY MEDICINE
Payer: COMMERCIAL

## 2022-01-11 DIAGNOSIS — Z23 NEED FOR VACCINATION: Primary | ICD-10-CM

## 2022-01-11 PROCEDURE — 0054A SARSCOV2 VAC 30MCG/0.3ML IM: CPT

## 2022-01-11 PROCEDURE — 0004A SARSCOV2 VAC 30MCG/0.3ML IM: CPT

## 2022-02-23 RX ORDER — VERAPAMIL HYDROCHLORIDE 240 MG/1
240 TABLET, FILM COATED, EXTENDED RELEASE ORAL DAILY
Qty: 90 TABLET | Refills: 1 | Status: SHIPPED | OUTPATIENT
Start: 2022-02-23

## 2022-02-23 RX ORDER — QUINAPRIL 20 MG/1
20 TABLET ORAL DAILY
Qty: 90 TABLET | Refills: 1 | Status: SHIPPED | OUTPATIENT
Start: 2022-02-23

## 2022-02-23 NOTE — TELEPHONE ENCOUNTER
Refill passed per [facility] protocol. Requested Prescriptions   Pending Prescriptions Disp Refills    Quinapril HCl 20 MG Oral Tab 90 tablet 3     Sig: Take 1 tablet (20 mg total) by mouth daily. Hypertensive Medications Protocol Passed - 2/23/2022 11:20 AM        Passed - CMP or BMP in past 12 months        Passed - Appointment in past 6 or next 3 months        Passed - GFR Non- > 50     Lab Results   Component Value Date    [facility] 11[facility]17[facility]                    verapamil  MG Oral Tab CR 90 tablet 3     Sig: Take 1 tablet (240 mg total) by mouth daily.         Hypertensive Medications Protocol Passed - 2/23/2022 11:20 AM        Passed - CMP or BMP in past 12 months        Passed - Appointment in past 6 or next 3 months        Passed - GFR Non- > 50     Lab Results   Component Value Date    [facility] 66 11/17/2021                     Recent Outpatient Visits              1 month ago Lumbar back pain    [facility], [address] [place], [place]    Office Visit    2 months ago Acute appendicitis with localized peritonitis, without perforation, abscess, or gangrene    [facility] Maryam Cain MD    Office Visit    6 months ago Sore throat    [facility], [address], [place], [place]    Telemedicine    1 year ago Routine general medical examination at a health care facility    [facility], [address], [place], [address] [place] [address] Visit    1 year ago Encounter for gynecological examination without abnormal finding    [facility], [address], [place], Milton Medina MD    Office Visit          Future Appointments         Provider Department Appt Notes    In 1 week Diomedes Whitney, [facility], [facility] General [address]

## 2022-03-04 ENCOUNTER — OFFICE VISIT (OUTPATIENT)
Dept: FAMILY MEDICINE CLINIC | Facility: CLINIC | Age: 47
End: 2022-03-04
Payer: COMMERCIAL

## 2022-03-04 VITALS
SYSTOLIC BLOOD PRESSURE: 126 MMHG | WEIGHT: 167 LBS | DIASTOLIC BLOOD PRESSURE: 84 MMHG | BODY MASS INDEX: 28.51 KG/M2 | HEART RATE: 70 BPM | HEIGHT: 64 IN

## 2022-03-04 DIAGNOSIS — I10 ESSENTIAL HYPERTENSION: ICD-10-CM

## 2022-03-04 DIAGNOSIS — G43.809 OTHER MIGRAINE WITHOUT STATUS MIGRAINOSUS, NOT INTRACTABLE: ICD-10-CM

## 2022-03-04 DIAGNOSIS — Z12.31 ENCOUNTER FOR SCREENING MAMMOGRAM FOR MALIGNANT NEOPLASM OF BREAST: ICD-10-CM

## 2022-03-04 DIAGNOSIS — F41.9 ANXIETY: ICD-10-CM

## 2022-03-04 DIAGNOSIS — Z00.00 ROUTINE GENERAL MEDICAL EXAMINATION AT A HEALTH CARE FACILITY: Primary | ICD-10-CM

## 2022-03-04 PROCEDURE — 3008F BODY MASS INDEX DOCD: CPT | Performed by: FAMILY MEDICINE

## 2022-03-04 PROCEDURE — 3079F DIAST BP 80-89 MM HG: CPT | Performed by: FAMILY MEDICINE

## 2022-03-04 PROCEDURE — 3074F SYST BP LT 130 MM HG: CPT | Performed by: FAMILY MEDICINE

## 2022-03-04 PROCEDURE — 99396 PREV VISIT EST AGE 40-64: CPT | Performed by: FAMILY MEDICINE

## 2022-04-21 RX ORDER — ALPRAZOLAM 0.5 MG/1
TABLET ORAL
Qty: 180 TABLET | Refills: 1 | Status: SHIPPED | OUTPATIENT
Start: 2022-04-21

## 2022-04-21 NOTE — TELEPHONE ENCOUNTER
Please review; protocol failed/no protocol    Requested Prescriptions   Pending Prescriptions Disp Refills    ALPRAZolam 0.5 MG Oral Tab 180 tablet 1     Sig: TAKE 1 TABLET BY MOUTH TWICE DAILY AS NEEDED        There is no refill protocol information for this order            Recent Outpatient Visits              1 month ago Routine general medical examination at a health care facility    [address] [place] [address]    Office Visit    3 months ago Lumbar back pain    [address] [place] [address]    Office Visit    4 months ago Acute appendicitis with localized peritonitis, without perforation, abscess, or gangrene    [facility] Kam Adams MD    Office Visit    8 months ago Sore throat    [address] [place] [address]    Telemedicine    1 year ago Routine general medical examination at a health care facility    [address] [place] [address]    Office Visit

## 2022-05-15 ENCOUNTER — HOSPITAL ENCOUNTER (EMERGENCY)
Facility: HOSPITAL | Age: 47
Discharge: HOME | End: 2022-05-16
Attending: EMERGENCY MEDICINE
Payer: COMMERCIAL

## 2022-05-15 ENCOUNTER — APPOINTMENT (OUTPATIENT)
Dept: CT IMAGING | Facility: HOSPITAL | Age: 47
End: 2022-05-15
Attending: EMERGENCY MEDICINE
Payer: COMMERCIAL

## 2022-05-15 DIAGNOSIS — K52.9 COLITIS: Primary | ICD-10-CM

## 2022-05-15 LAB
ALBUMIN SERPL-MCNC: 3.6 G/DL (ref 3.4–5)
ALBUMIN/GLOB SERPL: 1 {RATIO} (ref 1–2)
ALP LIVER SERPL-CCNC: 59 U/L
ALT SERPL-CCNC: 25 U/L
ANION GAP SERPL CALC-SCNC: 6 MMOL/L (ref 0–18)
AST SERPL-CCNC: 17 U/L (ref 15–37)
BASOPHILS # BLD AUTO: 0.05 X10(3) UL (ref 0–0.2)
BASOPHILS NFR BLD AUTO: 0.4 %
BILIRUB SERPL-MCNC: 0.6 MG/DL (ref 0.1–2)
BUN BLD-MCNC: 20 MG/DL (ref 7–18)
BUN/CREAT SERPL: 19.8 (ref 10–20)
CALCIUM BLD-MCNC: 9.1 MG/DL (ref 8.5–10.1)
CHLORIDE SERPL-SCNC: 106 MMOL/L (ref 98–112)
CO2 SERPL-SCNC: 29 MMOL/L (ref 21–32)
CREAT BLD-MCNC: 1.01 MG/DL
DEPRECATED RDW RBC AUTO: 42.5 FL (ref 35.1–46.3)
EOSINOPHIL # BLD AUTO: 0.07 X10(3) UL (ref 0–0.7)
EOSINOPHIL NFR BLD AUTO: 0.6 %
ERYTHROCYTE [DISTWIDTH] IN BLOOD BY AUTOMATED COUNT: 12.5 % (ref 11–15)
GLOBULIN PLAS-MCNC: 3.6 G/DL (ref 2.8–4.4)
GLUCOSE BLD-MCNC: 106 MG/DL (ref 70–99)
HCT VFR BLD AUTO: 39.6 %
HGB BLD-MCNC: 13.3 G/DL
IMM GRANULOCYTES # BLD AUTO: 0.03 X10(3) UL (ref 0–1)
IMM GRANULOCYTES NFR BLD: 0.3 %
LYMPHOCYTES # BLD AUTO: 1.69 X10(3) UL (ref 1–4)
LYMPHOCYTES NFR BLD AUTO: 14.4 %
MCH RBC QN AUTO: 30.7 PG (ref 26–34)
MCHC RBC AUTO-ENTMCNC: 33.6 G/DL (ref 31–37)
MCV RBC AUTO: 91.5 FL
MONOCYTES # BLD AUTO: 0.84 X10(3) UL (ref 0.1–1)
MONOCYTES NFR BLD AUTO: 7.1 %
NEUTROPHILS # BLD AUTO: 9.09 X10 (3) UL (ref 1.5–7.7)
NEUTROPHILS # BLD AUTO: 9.09 X10(3) UL (ref 1.5–7.7)
NEUTROPHILS NFR BLD AUTO: 77.2 %
OSMOLALITY SERPL CALC.SUM OF ELEC: 295 MOSM/KG (ref 275–295)
PLATELET # BLD AUTO: 217 10(3)UL (ref 150–450)
POTASSIUM SERPL-SCNC: 3.9 MMOL/L (ref 3.5–5.1)
PROT SERPL-MCNC: 7.2 G/DL (ref 6.4–8.2)
RBC # BLD AUTO: 4.33 X10(6)UL
SODIUM SERPL-SCNC: 141 MMOL/L (ref 136–145)
WBC # BLD AUTO: 11.8 X10(3) UL (ref 4–11)

## 2022-05-15 PROCEDURE — 96374 THER/PROPH/DIAG INJ IV PUSH: CPT

## 2022-05-15 PROCEDURE — 80053 COMPREHEN METABOLIC PANEL: CPT | Performed by: EMERGENCY MEDICINE

## 2022-05-15 PROCEDURE — 99284 EMERGENCY DEPT VISIT MOD MDM: CPT

## 2022-05-15 PROCEDURE — 81001 URINALYSIS AUTO W/SCOPE: CPT | Performed by: EMERGENCY MEDICINE

## 2022-05-15 PROCEDURE — 74177 CT ABD & PELVIS W/CONTRAST: CPT | Performed by: EMERGENCY MEDICINE

## 2022-05-15 PROCEDURE — 85025 COMPLETE CBC W/AUTO DIFF WBC: CPT | Performed by: EMERGENCY MEDICINE

## 2022-05-15 PROCEDURE — 96375 TX/PRO/DX INJ NEW DRUG ADDON: CPT

## 2022-05-15 PROCEDURE — 96361 HYDRATE IV INFUSION ADD-ON: CPT

## 2022-05-15 RX ORDER — ONDANSETRON 2 MG/ML
4 INJECTION INTRAMUSCULAR; INTRAVENOUS ONCE
Status: COMPLETED | OUTPATIENT
Start: 2022-05-15 | End: 2022-05-15

## 2022-05-15 RX ORDER — MORPHINE SULFATE 4 MG/ML
4 INJECTION, SOLUTION INTRAMUSCULAR; INTRAVENOUS ONCE
Status: COMPLETED | OUTPATIENT
Start: 2022-05-15 | End: 2022-05-15

## 2022-05-16 ENCOUNTER — LAB ENCOUNTER (OUTPATIENT)
Dept: LAB | Age: 47
End: 2022-05-16
Attending: EMERGENCY MEDICINE
Payer: COMMERCIAL

## 2022-05-16 VITALS
WEIGHT: 165 LBS | HEART RATE: 73 BPM | BODY MASS INDEX: 28.17 KG/M2 | OXYGEN SATURATION: 96 % | HEIGHT: 64 IN | SYSTOLIC BLOOD PRESSURE: 138 MMHG | RESPIRATION RATE: 20 BRPM | TEMPERATURE: 98 F | DIASTOLIC BLOOD PRESSURE: 82 MMHG

## 2022-05-16 DIAGNOSIS — K52.9 COLITIS: ICD-10-CM

## 2022-05-16 LAB
BILIRUB UR QL: NEGATIVE
CLARITY UR: CLEAR
COLOR UR: YELLOW
GLUCOSE UR-MCNC: NEGATIVE MG/DL
KETONES UR-MCNC: NEGATIVE MG/DL
LEUKOCYTE ESTERASE UR QL STRIP.AUTO: NEGATIVE
NITRITE UR QL STRIP.AUTO: NEGATIVE
PH UR: 7 [PH] (ref 5–8)
PROT UR-MCNC: NEGATIVE MG/DL
SP GR UR STRIP: 1.03 (ref 1–1.03)
UROBILINOGEN UR STRIP-ACNC: <2
VIT C UR-MCNC: NEGATIVE MG/DL

## 2022-05-16 PROCEDURE — 87046 STOOL CULTR AEROBIC BACT EA: CPT | Performed by: EMERGENCY MEDICINE

## 2022-05-16 PROCEDURE — 87045 FECES CULTURE AEROBIC BACT: CPT | Performed by: EMERGENCY MEDICINE

## 2022-05-16 PROCEDURE — 87427 SHIGA-LIKE TOXIN AG IA: CPT | Performed by: EMERGENCY MEDICINE

## 2022-05-16 RX ORDER — DICYCLOMINE HCL 20 MG
20 TABLET ORAL 4 TIMES DAILY PRN
Qty: 40 TABLET | Refills: 0 | Status: SHIPPED | OUTPATIENT
Start: 2022-05-16 | End: 2022-05-26

## 2022-05-16 RX ORDER — HYDROCODONE BITARTRATE AND ACETAMINOPHEN 5; 325 MG/1; MG/1
1 TABLET ORAL 2 TIMES DAILY PRN
Qty: 6 TABLET | Refills: 0 | Status: SHIPPED | OUTPATIENT
Start: 2022-05-16 | End: 2022-05-19

## 2022-05-16 RX ORDER — ONDANSETRON 4 MG/1
4 TABLET, ORALLY DISINTEGRATING ORAL EVERY 8 HOURS PRN
Qty: 15 TABLET | Refills: 0 | Status: SHIPPED | OUTPATIENT
Start: 2022-05-16 | End: 2022-05-21

## 2022-05-16 NOTE — ED INITIAL ASSESSMENT (HPI)
Patient presents to ER with complaints of abdominal pain, vomiting and diarrhea. Patient notes blood in stool. Patient notes BRB.

## 2022-05-19 ENCOUNTER — OFFICE VISIT (OUTPATIENT)
Dept: FAMILY MEDICINE CLINIC | Facility: CLINIC | Age: 47
End: 2022-05-19
Payer: COMMERCIAL

## 2022-05-19 VITALS
HEART RATE: 62 BPM | WEIGHT: 164 LBS | HEIGHT: 64 IN | DIASTOLIC BLOOD PRESSURE: 85 MMHG | BODY MASS INDEX: 28 KG/M2 | SYSTOLIC BLOOD PRESSURE: 127 MMHG

## 2022-05-19 DIAGNOSIS — K52.9 COLITIS: Primary | ICD-10-CM

## 2022-05-19 PROCEDURE — 3079F DIAST BP 80-89 MM HG: CPT | Performed by: FAMILY MEDICINE

## 2022-05-19 PROCEDURE — 3074F SYST BP LT 130 MM HG: CPT | Performed by: FAMILY MEDICINE

## 2022-05-19 PROCEDURE — 3008F BODY MASS INDEX DOCD: CPT | Performed by: FAMILY MEDICINE

## 2022-05-19 PROCEDURE — 99214 OFFICE O/P EST MOD 30 MIN: CPT | Performed by: FAMILY MEDICINE

## 2022-05-19 RX ORDER — CIPROFLOXACIN 500 MG/1
500 TABLET, FILM COATED ORAL 2 TIMES DAILY
Qty: 14 TABLET | Refills: 0 | Status: SHIPPED | OUTPATIENT
Start: 2022-05-19

## 2022-08-22 RX ORDER — QUINAPRIL 20 MG/1
TABLET ORAL
Qty: 90 TABLET | Refills: 3 | Status: SHIPPED | OUTPATIENT
Start: 2022-08-22

## 2022-08-23 RX ORDER — VERAPAMIL HYDROCHLORIDE 240 MG/1
240 TABLET, FILM COATED, EXTENDED RELEASE ORAL DAILY
Qty: 90 TABLET | Refills: 1 | Status: SHIPPED | OUTPATIENT
Start: 2022-08-23

## 2022-08-23 NOTE — TELEPHONE ENCOUNTER
Refill passed per [facility] protocol   Requested Prescriptions   Pending Prescriptions Disp Refills    VERAPAMIL  MG Oral Tab CR [Pharmacy Med Name: VERAPAMIL ER TABS 240MG] 90 tablet 3     Sig: TAKE 1 TABLET DAILY        Hypertensive Medications Protocol Passed - 8/23/2022  1:32 PM        Passed - In person appointment in the past 12 or next 3 months       Recent Outpatient Visits              3 months ago [address], [facility]    Office Visit    5 months ago Routine general medical examination at a health care facility    [facility], [address] [facility]    Office Visit    7 months ago Lumbar back pain    [facility], [address] [place] [address]    Office Visit    8 months ago Acute appendicitis with localized peritonitis, without perforation, abscess, or gangrene    [facility] Cris Bell MD    Office Visit    1 year ago Sore throat    [facility], [address] [place] [address]    Telemedicine                 Passed - Last BP reading less than 140/90     BP Readings from Last 1 Encounters:  05/19/22 : 127/85                Passed - CMP or BMP in past 6 months     Recent Results (from the past 4392 hour(s))   Comp Metabolic Panel (14)    Collection Time: 05/15/22 10:23 PM   Result Value Ref Range    Glucose 106 (H) 70 - 99 mg/dL    Sodium 141 136 - 145 mmol/L    Potassium 3.9 3.5 - 5.1 mmol/L    Chloride 106 98 - 112 mmol/L    CO2 29.0 21.0 - 32.0 mmol/L    Anion Gap 6 0 - 18 mmol/L    BUN 20 (H) 7 - 18 mg/dL    Creatinine 1.01 0.55 - 1.02 mg/dL    BUN/CREA Ratio 19.8 10.0 - 20.0    Calcium, Total 9.1 8.5 - 10.1 mg/dL    Calculated Osmolality 295 275 - 295 mOsm/kg    GFR, Non- 66 >=60    GFR, -American 76 >=60    ALT 25 13 - 56 U/L    AST 17 15 - 37 U/L    Alkaline Phosphatase 59 39 - 100 U/L    Bilirubin, Total 0.6 0.1 - 2.0 mg/dL    Total Protein 7.2 6.4 - 8.2 g/dL    Albumin 3.6 3.4 - 5.0 g/dL    Globulin  3.6 2.8 - 4.4 g/dL    A/G Ratio 1.0 1.0 - 2.0     *Note: Due to a large number of results and/or encounters for the requested time period, some results have not been displayed. A complete set of results can be found in Results Review.                  Passed - In person appointment or virtual visit in the past 6 months       Recent Outpatient Visits              3 months ago [address], [address] [place] [address]    Office Visit    5 months ago Routine general medical examination at a health care facility    [facility], [address] [place] [address], [address] [place] [address] Visit    7 months ago Lumbar back pain    [facility], [address] [place] [address], [address] [place] [address]    Office Visit    8 months ago Acute appendicitis with localized peritonitis, without perforation, abscess, or gangrene    [facility] Randi Coats MD    Office Visit    1 year ago Sore throat    [facility], [address] [place] [address]    Telemedicine                 Passed - GFR > 50     No results found for: [facility]

## 2022-10-08 LAB
ALBUMIN/GLOBULIN RATIO: 1.4 (CALC) (ref 1–2.5)
ALBUMIN: 4.2 G/DL (ref 3.6–5.1)
ALKALINE PHOSPHATASE: 67 U/L (ref 31–125)
ALT: 16 U/L (ref 6–29)
AST: 16 U/L (ref 10–35)
BILIRUBIN, TOTAL: 0.8 MG/DL (ref 0.2–1.2)
BUN: 17 MG/DL (ref 7–25)
CALCIUM: 9.5 MG/DL (ref 8.6–10.2)
CARBON DIOXIDE: 29 MMOL/L (ref 20–32)
CHLORIDE: 103 MMOL/L (ref 98–110)
CHOL/HDLC RATIO: 2.8 (CALC)
CHOLESTEROL, TOTAL: 228 MG/DL
CREATININE: 0.91 MG/DL (ref 0.5–0.99)
EGFR: 78 ML/MIN/1.73M2
GLOBULIN: 3.1 G/DL (CALC) (ref 1.9–3.7)
GLUCOSE: 84 MG/DL (ref 65–99)
HDL CHOLESTEROL: 81 MG/DL
HEMATOCRIT: 43 % (ref 35–45)
HEMOGLOBIN: 14.3 G/DL (ref 11.7–15.5)
LDL-CHOLESTEROL: 128 MG/DL (CALC)
MCH: 31.1 PG (ref 27–33)
MCHC: 33.3 G/DL (ref 32–36)
MCV: 93.5 FL (ref 80–100)
NON-HDL CHOLESTEROL: 147 MG/DL (CALC)
POTASSIUM: 4.2 MMOL/L (ref 3.5–5.3)
PROTEIN, TOTAL: 7.3 G/DL (ref 6.1–8.1)
RDW: 12.7 % (ref 11–15)
RED BLOOD CELL COUNT: 4.6 MILLION/UL (ref 3.8–5.1)
SODIUM: 138 MMOL/L (ref 135–146)
TRIGLYCERIDES: 87 MG/DL
WHITE BLOOD CELL COUNT: 6.6 THOUSAND/UL (ref 3.8–10.8)

## 2022-10-26 NOTE — TELEPHONE ENCOUNTER
Please review refill protocol failed/ no protocol  Requested Prescriptions   Pending Prescriptions Disp Refills    ALPRAZolam 0.5 MG Oral Tab 180 tablet 1     Sig: TAKE 1 TABLET BY MOUTH TWICE DAILY AS NEEDED       There is no refill protocol information for this order

## 2022-10-27 RX ORDER — ALPRAZOLAM 0.5 MG/1
TABLET ORAL
Qty: 180 TABLET | Refills: 1 | Status: SHIPPED | OUTPATIENT
Start: 2022-10-27

## 2022-11-09 ENCOUNTER — HOSPITAL ENCOUNTER (OUTPATIENT)
Dept: MAMMOGRAPHY | Age: 47
Discharge: HOME | End: 2022-11-09
Attending: FAMILY MEDICINE
Payer: COMMERCIAL

## 2022-11-09 DIAGNOSIS — Z12.31 ENCOUNTER FOR SCREENING MAMMOGRAM FOR MALIGNANT NEOPLASM OF BREAST: ICD-10-CM

## 2022-11-09 PROCEDURE — 77067 SCR MAMMO BI INCL CAD: CPT | Performed by: FAMILY MEDICINE

## 2022-11-09 PROCEDURE — 77063 BREAST TOMOSYNTHESIS BI: CPT | Performed by: FAMILY MEDICINE

## 2023-01-12 ENCOUNTER — TELEPHONE (OUTPATIENT)
Dept: FAMILY MEDICINE CLINIC | Facility: CLINIC | Age: 48
End: 2023-01-12

## 2023-01-12 RX ORDER — LISINOPRIL 20 MG/1
20 TABLET ORAL DAILY
Qty: 90 TABLET | Refills: 1 | Status: SHIPPED | OUTPATIENT
Start: 2023-01-12

## 2023-01-12 NOTE — TELEPHONE ENCOUNTER
Spoke with the patient,verified full name and       Informed her of message and instructions    No further questions

## 2023-03-17 ENCOUNTER — OFFICE VISIT (OUTPATIENT)
Dept: FAMILY MEDICINE CLINIC | Facility: CLINIC | Age: 48
End: 2023-03-17

## 2023-03-17 VITALS
HEIGHT: 64 IN | BODY MASS INDEX: 28.51 KG/M2 | HEART RATE: 76 BPM | OXYGEN SATURATION: 97 % | SYSTOLIC BLOOD PRESSURE: 114 MMHG | DIASTOLIC BLOOD PRESSURE: 70 MMHG | WEIGHT: 167 LBS

## 2023-03-17 DIAGNOSIS — Z00.00 ROUTINE GENERAL MEDICAL EXAMINATION AT A HEALTH CARE FACILITY: Primary | ICD-10-CM

## 2023-03-17 DIAGNOSIS — I10 ESSENTIAL HYPERTENSION: ICD-10-CM

## 2023-03-17 DIAGNOSIS — F41.9 ANXIETY: ICD-10-CM

## 2023-03-17 PROCEDURE — 99396 PREV VISIT EST AGE 40-64: CPT | Performed by: FAMILY MEDICINE

## 2023-03-17 PROCEDURE — 3078F DIAST BP <80 MM HG: CPT | Performed by: FAMILY MEDICINE

## 2023-03-17 PROCEDURE — 3008F BODY MASS INDEX DOCD: CPT | Performed by: FAMILY MEDICINE

## 2023-03-17 PROCEDURE — 3074F SYST BP LT 130 MM HG: CPT | Performed by: FAMILY MEDICINE

## 2023-05-02 RX ORDER — ALPRAZOLAM 0.5 MG/1
TABLET ORAL
Qty: 180 TABLET | Refills: 1 | Status: SHIPPED | OUTPATIENT
Start: 2023-05-02

## 2023-05-02 RX ORDER — VERAPAMIL HYDROCHLORIDE 240 MG/1
240 TABLET, FILM COATED, EXTENDED RELEASE ORAL DAILY
Qty: 60 TABLET | Refills: 0 | Status: SHIPPED | OUTPATIENT
Start: 2023-05-02

## 2023-06-18 ENCOUNTER — APPOINTMENT (OUTPATIENT)
Dept: GENERAL RADIOLOGY | Age: 48
End: 2023-06-18
Attending: NURSE PRACTITIONER
Payer: COMMERCIAL

## 2023-06-18 ENCOUNTER — HOSPITAL ENCOUNTER (OUTPATIENT)
Age: 48
Discharge: HOME | End: 2023-06-18
Payer: COMMERCIAL

## 2023-06-18 VITALS
SYSTOLIC BLOOD PRESSURE: 121 MMHG | OXYGEN SATURATION: 98 % | DIASTOLIC BLOOD PRESSURE: 76 MMHG | HEART RATE: 76 BPM | TEMPERATURE: 98 F | RESPIRATION RATE: 16 BRPM

## 2023-06-18 DIAGNOSIS — S43.402A SPRAIN OF LEFT SHOULDER, UNSPECIFIED SHOULDER SPRAIN TYPE, INITIAL ENCOUNTER: ICD-10-CM

## 2023-06-18 DIAGNOSIS — S00.83XA CONTUSION OF FACE, INITIAL ENCOUNTER: ICD-10-CM
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
## 2023-06-18 DIAGNOSIS — S62.501A CLOSED AVULSION FRACTURE OF PHALANX OF RIGHT THUMB, INITIAL ENCOUNTER: ICD-10-CM

## 2023-06-18 PROCEDURE — 90715 TDAP VACCINE 7 YRS/> IM: CPT | Performed by: NURSE PRACTITIONER

## 2023-06-18 PROCEDURE — A4565 SLINGS: HCPCS | Performed by: NURSE PRACTITIONER

## 2023-06-18 PROCEDURE — 73030 X-RAY EXAM OF SHOULDER: CPT | Performed by: NURSE PRACTITIONER

## 2023-06-18 PROCEDURE — 99213 OFFICE O/P EST LOW 20 MIN: CPT | Performed by: NURSE PRACTITIONER

## 2023-06-18 PROCEDURE — L3924 HFO WITHOUT JOINTS PRE OTS: HCPCS | Performed by: NURSE PRACTITIONER

## 2023-06-18 PROCEDURE — 90471 IMMUNIZATION ADMIN: CPT | Performed by: NURSE PRACTITIONER

## 2023-06-18 PROCEDURE — 73140 X-RAY EXAM OF FINGER(S): CPT | Performed by: NURSE PRACTITIONER

## 2023-06-18 NOTE — DISCHARGE INSTRUCTIONS
Ice to the areas of soreness. Gentle stretching exercises. Ibuprofen as needed for pain. Follow-up with orthopedics. Return for any concerns.

## 2023-06-18 NOTE — ED INITIAL ASSESSMENT (HPI)
Patient fell Friday night. States excited dog pulled her forward as she was opening front door.  States she fell on right face and dog pulled on L arm, injuring shoulder

## 2023-06-19 ENCOUNTER — TELEPHONE (OUTPATIENT)
Dept: ORTHOPEDICS CLINIC | Facility: CLINIC | Age: 48
End: 2023-06-19

## 2023-06-19 NOTE — TELEPHONE ENCOUNTER
Can you see patient and when?     CONCLUSION: 1 millimeter bone density/calcification adjacent to the ulnar aspect at the base of the proximal phalanx of the right thumb potentially representing avulsion fracture, age indeterminate

## 2023-06-19 NOTE — TELEPHONE ENCOUNTER
Scheduled. Imaging needed?   Future Appointments   Date Time Provider Abe Simental   6/20/2023  8:15 AM Maria Luisa Gimenez MD [facility]   7/5/2023 10:00 AM [id number]

## 2023-06-19 NOTE — TELEPHONE ENCOUNTER
Please add patient to Dr. Mary Ellen Ramires schedule for tomorrow in [facility]. Thanks!

## 2023-06-19 NOTE — TELEPHONE ENCOUNTER
Imaging was completed for this patient for a lt shoulder, but it needs to be reviewed to see if additional imaging is needed. If so, please enter the appropriate RX and let the patient know to come in before their appointment to complete the additional imaging. Thank you!     Future Appointments   Date Time Provider Abe Simental   7/5/2023 10:00 AM Hiram Keenan MD [id number]

## 2023-06-19 NOTE — TELEPHONE ENCOUNTER
Patient is requesting an appt for a rt thumb fx. Imaging in [facility]. Patient is currently in a splint. Please advise if / when to schedule pt. Thanks!

## 2023-06-20 ENCOUNTER — OFFICE VISIT (OUTPATIENT)
Dept: ORTHOPEDICS CLINIC | Facility: CLINIC | Age: 48
End: 2023-06-20
Payer: COMMERCIAL

## 2023-06-20 VITALS — BODY MASS INDEX: 28.51 KG/M2 | WEIGHT: 167 LBS | HEIGHT: 64 IN

## 2023-06-20 DIAGNOSIS — S62.514A CLOSED NONDISPLACED FRACTURE OF PROXIMAL PHALANX OF RIGHT THUMB, INITIAL ENCOUNTER: Primary | ICD-10-CM

## 2023-06-20 PROCEDURE — 3008F BODY MASS INDEX DOCD: CPT | Performed by: ORTHOPAEDIC SURGERY

## 2023-06-20 PROCEDURE — 99204 OFFICE O/P NEW MOD 45 MIN: CPT | Performed by: ORTHOPAEDIC SURGERY

## 2023-07-05 ENCOUNTER — TELEPHONE (OUTPATIENT)
Dept: ORTHOPEDICS CLINIC | Facility: CLINIC | Age: 48
End: 2023-07-05

## 2023-07-05 ENCOUNTER — OFFICE VISIT (OUTPATIENT)
Dept: ORTHOPEDICS CLINIC | Facility: CLINIC | Age: 48
End: 2023-07-05
Payer: COMMERCIAL

## 2023-07-05 VITALS — HEIGHT: 64 IN | WEIGHT: 165 LBS | BODY MASS INDEX: 28.17 KG/M2

## 2023-07-05 DIAGNOSIS — S46.002A INJURY OF LEFT ROTATOR CUFF, INITIAL ENCOUNTER: Primary | ICD-10-CM

## 2023-07-05 PROCEDURE — 3008F BODY MASS INDEX DOCD: CPT | Performed by: ORTHOPAEDIC SURGERY

## 2023-07-05 PROCEDURE — 99214 OFFICE O/P EST MOD 30 MIN: CPT | Performed by: ORTHOPAEDIC SURGERY

## 2023-07-05 RX ORDER — MELOXICAM 15 MG/1
15 TABLET ORAL DAILY
Qty: 14 TABLET | Refills: 1 | Status: SHIPPED | OUTPATIENT
Start: 2023-07-05

## 2023-07-05 NOTE — TELEPHONE ENCOUNTER
Patient called and states that shw was not able to get into the Ultrasound STAT for a sooner appt until mid July or Early August since the tech is only available around that time. She wants to know what to do. Please advise. Thanks.     Patient can be reached at [phone]

## 2023-07-07 ENCOUNTER — TELEPHONE (OUTPATIENT)
Dept: ORTHOPEDICS CLINIC | Facility: CLINIC | Age: 48
End: 2023-07-07

## 2023-07-07 NOTE — TELEPHONE ENCOUNTER
Disab forms received and logged for processing. No DMITRI completed Parking Pandat message sent to pt.

## 2023-07-07 NOTE — TELEPHONE ENCOUNTER
Received [facility] paperwork via fax from [employer]. Sent to Forms Department by Email. NO DMITRI or FEE Accepted.

## 2023-07-11 ENCOUNTER — HOSPITAL ENCOUNTER (OUTPATIENT)
Dept: GENERAL RADIOLOGY | Age: 48
Discharge: HOME | End: 2023-07-11
Attending: ORTHOPAEDIC SURGERY
Payer: COMMERCIAL

## 2023-07-11 ENCOUNTER — OFFICE VISIT (OUTPATIENT)
Dept: ORTHOPEDICS CLINIC | Facility: CLINIC | Age: 48
End: 2023-07-11
Payer: COMMERCIAL

## 2023-07-11 VITALS — WEIGHT: 165 LBS | HEIGHT: 64 IN | BODY MASS INDEX: 28.17 KG/M2

## 2023-07-11 DIAGNOSIS — S62.514A CLOSED NONDISPLACED FRACTURE OF PROXIMAL PHALANX OF RIGHT THUMB, INITIAL ENCOUNTER: Primary | ICD-10-CM

## 2023-07-11 DIAGNOSIS — S62.514A CLOSED NONDISPLACED FRACTURE OF PROXIMAL PHALANX OF RIGHT THUMB, INITIAL ENCOUNTER: ICD-10-CM

## 2023-07-11 PROCEDURE — 73140 X-RAY EXAM OF FINGER(S): CPT | Performed by: ORTHOPAEDIC SURGERY

## 2023-07-11 PROCEDURE — 99213 OFFICE O/P EST LOW 20 MIN: CPT | Performed by: ORTHOPAEDIC SURGERY

## 2023-07-11 PROCEDURE — 3008F BODY MASS INDEX DOCD: CPT | Performed by: ORTHOPAEDIC SURGERY

## 2023-07-11 RX ORDER — VERAPAMIL HYDROCHLORIDE 240 MG/1
240 TABLET, FILM COATED, EXTENDED RELEASE ORAL DAILY
Qty: 60 TABLET | Refills: 0 | OUTPATIENT
Start: 2023-07-11

## 2023-07-12 NOTE — TELEPHONE ENCOUNTER
Dr. Reny Zavala,      Please sign off on form if you agree to: Continuous FMLA due to thumb Fx start: 6/18/23---RTW  7/17/23   (Please place your signature on the first page only)    -From your Inbasket, Highlight the patient and click Chart   -Double click the 9[birth year]  Forms Completion telephone encounter  -Yesika Carlson down to the Media section   -Click the blue Hyperlink: Disab Dr. Reny Zavala 9/73/21   -Click Acknowledge located at the bottom right corner (if you do not see acknowledge, try maximizing your window)   -Drag the mouse into the blank space of the document and a + sign will appear. Left click to   electronically sign the document.      Thank you,  [facility]

## 2023-07-13 NOTE — TELEPHONE ENCOUNTER
Pt called to check status of forms. Informed her forms were faxed today. Pt expressed understanding.

## 2023-07-13 NOTE — TELEPHONE ENCOUNTER
Forms completed and faxed to [employer] at [phone] and confirmation rcv'd. Socialspielt message sent to pt.

## 2023-07-14 NOTE — TELEPHONE ENCOUNTER
Spoke with pt regarding her forms. Pt states the fax # that she provided us in her MyChart response, is incorrect. [facility] forms re-faxed to [employer] at the correct fax # ([phone]) listed on the form. Fax confirmation received. Yieldext message sent to pt.

## 2023-08-01 ENCOUNTER — MED REC SCAN ONLY (OUTPATIENT)
Dept: ORTHOPEDICS CLINIC | Facility: CLINIC | Age: 48
End: 2023-08-01

## 2023-08-09 ENCOUNTER — OFFICE VISIT (OUTPATIENT)
Dept: ORTHOPEDICS CLINIC | Facility: CLINIC | Age: 48
End: 2023-08-09
Payer: COMMERCIAL

## 2023-08-09 DIAGNOSIS — M75.82 TENDINITIS OF LEFT ROTATOR CUFF: Primary | ICD-10-CM

## 2023-08-09 DIAGNOSIS — M75.02 ADHESIVE CAPSULITIS OF LEFT SHOULDER: ICD-10-CM

## 2023-08-09 PROCEDURE — 20610 DRAIN/INJ JOINT/BURSA W/O US: CPT | Performed by: ORTHOPAEDIC SURGERY

## 2023-08-09 PROCEDURE — 99214 OFFICE O/P EST MOD 30 MIN: CPT | Performed by: ORTHOPAEDIC SURGERY

## 2023-08-09 RX ORDER — KETOROLAC TROMETHAMINE 30 MG/ML
30 INJECTION, SOLUTION INTRAMUSCULAR; INTRAVENOUS ONCE
Status: COMPLETED | OUTPATIENT
Start: 2023-08-09 | End: 2023-08-09

## 2023-08-09 RX ORDER — TRIAMCINOLONE ACETONIDE 40 MG/ML
40 INJECTION, SUSPENSION INTRA-ARTICULAR; INTRAMUSCULAR ONCE
Status: COMPLETED | OUTPATIENT
Start: 2023-08-09 | End: 2023-08-09

## 2023-08-09 RX ADMIN — TRIAMCINOLONE ACETONIDE 40 MG: 40 INJECTION, SUSPENSION INTRA-ARTICULAR; INTRAMUSCULAR at 11:20:00

## 2023-08-09 RX ADMIN — KETOROLAC TROMETHAMINE 30 MG: 30 INJECTION, SOLUTION INTRAMUSCULAR; INTRAVENOUS at 15:19:00

## 2023-08-09 NOTE — PROCEDURES
Left Shoulder Glenohumeral Joint Injection    Name: Parish Zuniga   MRN: [id number]  Date: 8/9/2023     Clinical Indications:   Adhesive Capsulitis. / Rotator Cuff Tendinitis    After informed consent, the injection site was marked, sterilized with topical chlorhexidine antiseptic, and locally anesthetized with skin refrigerant. The patient was seated upright and the shoulder was exposed. Using sterile technique: 1 mL of 30mg/mL of Ketorolac, 2 mL of 0.5% Bupivicaine, 2 mL of 1% Lidocaine, and 1 mg of 40mg/mL of Triamcinolone (Kenalog) was injected with a Anterior approach utilizing a 22 gauge needle. A band-aid was applied. The patient tolerated the procedure well. Luis Lemons MD  Knee, Shoulder, & Elbow Surgery / Sports Medicine Specialist  [facility]  [address]   [email or web address]  t: [phone]  o: [phone]  f: [phone]

## 2023-10-04 ENCOUNTER — OFFICE VISIT (OUTPATIENT)
Dept: PHYSICAL THERAPY | Age: 48
End: 2023-10-04
Attending: ORTHOPAEDIC SURGERY
Payer: COMMERCIAL

## 2023-10-04 ENCOUNTER — TELEPHONE (OUTPATIENT)
Dept: PHYSICAL THERAPY | Facility: HOSPITAL | Age: 48
End: 2023-10-04

## 2023-10-04 DIAGNOSIS — M75.82 TENDINITIS OF LEFT ROTATOR CUFF: Primary | ICD-10-CM

## 2023-10-04 DIAGNOSIS — M75.02 ADHESIVE CAPSULITIS OF LEFT SHOULDER: ICD-10-CM

## 2023-10-04 PROCEDURE — 97161 PT EVAL LOW COMPLEX 20 MIN: CPT | Performed by: PHYSICAL THERAPIST

## 2023-10-04 PROCEDURE — 97110 THERAPEUTIC EXERCISES: CPT | Performed by: PHYSICAL THERAPIST

## 2023-10-06 ENCOUNTER — APPOINTMENT (OUTPATIENT)
Dept: PHYSICAL THERAPY | Age: 48
End: 2023-10-06
Attending: ORTHOPAEDIC SURGERY
Payer: COMMERCIAL

## 2023-10-10 ENCOUNTER — APPOINTMENT (OUTPATIENT)
Dept: PHYSICAL THERAPY | Age: 48
End: 2023-10-10
Attending: ORTHOPAEDIC SURGERY
Payer: COMMERCIAL

## 2023-10-11 ENCOUNTER — LAB ENCOUNTER (OUTPATIENT)
Dept: LAB | Age: 48
End: 2023-10-11
Attending: FAMILY MEDICINE
Payer: COMMERCIAL

## 2023-10-11 DIAGNOSIS — Z00.00 ROUTINE GENERAL MEDICAL EXAMINATION AT A HEALTH CARE FACILITY: ICD-10-CM

## 2023-10-11 LAB
ALBUMIN SERPL-MCNC: 3.8 G/DL (ref 3.4–5)
ALBUMIN/GLOB SERPL: 1 {RATIO} (ref 1–2)
ALP LIVER SERPL-CCNC: 56 U/L
ALT SERPL-CCNC: 31 U/L
ANION GAP SERPL CALC-SCNC: 8 MMOL/L (ref 0–18)
AST SERPL-CCNC: 16 U/L (ref 15–37)
BASOPHILS # BLD AUTO: 0.05 X10(3) UL (ref 0–0.2)
BASOPHILS NFR BLD AUTO: 0.8 %
BILIRUB SERPL-MCNC: 1.1 MG/DL (ref 0.1–2)
BUN BLD-MCNC: 16 MG/DL (ref 7–18)
BUN/CREAT SERPL: 15.8 (ref 10–20)
CALCIUM BLD-MCNC: 9.5 MG/DL (ref 8.5–10.1)
CHLORIDE SERPL-SCNC: 107 MMOL/L (ref 98–112)
CHOLEST SERPL-MCNC: 209 MG/DL (ref ?–200)
CO2 SERPL-SCNC: 25 MMOL/L (ref 21–32)
CREAT BLD-MCNC: 1.01 MG/DL
DEPRECATED RDW RBC AUTO: 44.4 FL (ref 35.1–46.3)
EGFRCR SERPLBLD CKD-EPI 2021: 68 ML/MIN/1.73M2 (ref 60–?)
EOSINOPHIL # BLD AUTO: 0.2 X10(3) UL (ref 0–0.7)
EOSINOPHIL NFR BLD AUTO: 3.1 %
ERYTHROCYTE [DISTWIDTH] IN BLOOD BY AUTOMATED COUNT: 12.9 % (ref 11–15)
FASTING PATIENT LIPID ANSWER: YES
FASTING STATUS PATIENT QL REPORTED: YES
GLOBULIN PLAS-MCNC: 3.8 G/DL (ref 2.8–4.4)
GLUCOSE BLD-MCNC: 95 MG/DL (ref 70–99)
HCT VFR BLD AUTO: 44.1 %
HDLC SERPL-MCNC: 76 MG/DL (ref 40–59)
HGB BLD-MCNC: 14.3 G/DL
IMM GRANULOCYTES # BLD AUTO: 0.01 X10(3) UL (ref 0–1)
IMM GRANULOCYTES NFR BLD: 0.2 %
LDLC SERPL CALC-MCNC: 110 MG/DL (ref ?–100)
LYMPHOCYTES # BLD AUTO: 1.86 X10(3) UL (ref 1–4)
LYMPHOCYTES NFR BLD AUTO: 29 %
MCH RBC QN AUTO: 30.4 PG (ref 26–34)
MCHC RBC AUTO-ENTMCNC: 32.4 G/DL (ref 31–37)
MCV RBC AUTO: 93.6 FL
MONOCYTES # BLD AUTO: 0.51 X10(3) UL (ref 0.1–1)
MONOCYTES NFR BLD AUTO: 8 %
NEUTROPHILS # BLD AUTO: 3.78 X10 (3) UL (ref 1.5–7.7)
NEUTROPHILS # BLD AUTO: 3.78 X10(3) UL (ref 1.5–7.7)
NEUTROPHILS NFR BLD AUTO: 58.9 %
NONHDLC SERPL-MCNC: 133 MG/DL (ref ?–130)
OSMOLALITY SERPL CALC.SUM OF ELEC: 291 MOSM/KG (ref 275–295)
PLATELET # BLD AUTO: 226 10(3)UL (ref 150–450)
POTASSIUM SERPL-SCNC: 4.2 MMOL/L (ref 3.5–5.1)
PROT SERPL-MCNC: 7.6 G/DL (ref 6.4–8.2)
RBC # BLD AUTO: 4.71 X10(6)UL
SODIUM SERPL-SCNC: 140 MMOL/L (ref 136–145)
TRIGL SERPL-MCNC: 131 MG/DL (ref 30–149)
VIT D+METAB SERPL-MCNC: 18.9 NG/ML (ref 30–100)
VLDLC SERPL CALC-MCNC: 22 MG/DL (ref 0–30)
WBC # BLD AUTO: 6.4 X10(3) UL (ref 4–11)

## 2023-10-11 PROCEDURE — 80053 COMPREHEN METABOLIC PANEL: CPT

## 2023-10-11 PROCEDURE — 85025 COMPLETE CBC W/AUTO DIFF WBC: CPT

## 2023-10-11 PROCEDURE — 36415 COLL VENOUS BLD VENIPUNCTURE: CPT

## 2023-10-11 PROCEDURE — 80061 LIPID PANEL: CPT

## 2023-10-11 PROCEDURE — 82306 VITAMIN D 25 HYDROXY: CPT

## 2023-10-12 ENCOUNTER — TELEPHONE (OUTPATIENT)
Dept: PHYSICAL THERAPY | Facility: HOSPITAL | Age: 48
End: 2023-10-12

## 2023-10-19 ENCOUNTER — APPOINTMENT (OUTPATIENT)
Dept: PHYSICAL THERAPY | Age: 48
End: 2023-10-19
Attending: ORTHOPAEDIC SURGERY
Payer: COMMERCIAL

## 2023-10-26 ENCOUNTER — APPOINTMENT (OUTPATIENT)
Dept: PHYSICAL THERAPY | Age: 48
End: 2023-10-26
Attending: ORTHOPAEDIC SURGERY
Payer: COMMERCIAL

## 2023-10-31 NOTE — TELEPHONE ENCOUNTER
Please review; protocol failed.    Requested Prescriptions   Pending Prescriptions Disp Refills    ALPRAZolam 0.5 MG Oral Tab 180 tablet 1     Sig: TAKE 1 TABLET BY MOUTH TWICE DAILY AS NEEDED       There is no refill protocol information for this order        Recent Outpatient Visits              3 weeks ago Tendinitis of left rotator cuff    [facility] [place] [address]., Tanmay Leal, PT    Office Visit    2 months ago Tendinitis of left rotator cuff    [address], Terry Pardo MD    Office Visit    3 months ago Closed nondisplaced fracture of proximal phalanx of right thumb, initial encounter    [facility] Alfredo MD    Office Visit    3 months ago Injury of left rotator cuff, initial encounter    [address], [facility], Terry Pardo MD    Office Visit    4 months ago Closed nondisplaced fracture of proximal phalanx of right thumb, initial encounter    [facility] A[facility] Alfredo MD    Office Visit

## 2023-11-01 RX ORDER — ALPRAZOLAM 0.5 MG/1
TABLET ORAL
Qty: 180 TABLET | Refills: 1 | Status: SHIPPED | OUTPATIENT
Start: 2023-11-01

## 2023-11-02 ENCOUNTER — APPOINTMENT (OUTPATIENT)
Dept: PHYSICAL THERAPY | Age: 48
End: 2023-11-02
Attending: ORTHOPAEDIC SURGERY
Payer: COMMERCIAL

## 2023-11-14 ENCOUNTER — APPOINTMENT (OUTPATIENT)
Dept: PHYSICAL THERAPY | Age: 48
End: 2023-11-14
Attending: ORTHOPAEDIC SURGERY
Payer: COMMERCIAL

## 2024-03-06 NOTE — TELEPHONE ENCOUNTER
Please review; protocol failed/No Protocol   Last office visit with provider 3/17/2023  Hypertensive Medications  Protocol Criteria:  Appointment scheduled in the past 6 months or in the next 3 months  BMP or CMP in the past 12 months  Creatinine result < 2  Recent Outpatient Visits              5 months ago Tendinitis of left rotator cuff    [facility] [place] , Reginaldo CARROLL, PT    Office Visit    7 months ago Tendinitis of left rotator cuff    [facility], [address] [place], [place] [address]ridge Vick Tinajero MD    Office Visit    7 months ago Closed nondisplaced fracture of proximal phalanx of right thumb, initial encounter    [facility] [place] Patel, Kushal, MD    Office Visit    8 months ago Injury of left rotator cuff, initial encounter    [facility], [address] [place], [place] [address] Vick Tinajero MD    Office Visit    8 months ago Closed nondisplaced fracture of proximal phalanx of right thumb, initial encounter    [facility] [place] Anuj Batista MD    Office Visit            Lab Results   Component Value Date    GLU 95 10/11/2023    BUN 16 10/11/2023    CREATSERUM 1.01 10/11/2023    BUNCREA 15.8 10/11/2023    GFRNAA 66 05/15/2022    GFRAA 76 05/15/2022    CA 9.5 10/11/2023    ALKPHOS 60 11/25/2015    AST 16 10/11/2023    ALT 31 10/11/2023    BILT 1.1 10/11/2023    TP 7.6 10/11/2023    ALB 3.8 10/11/2023     10/11/2023    K 4.2 10/11/2023     10/11/2023    CO2 25.0 10/11/2023    GLOBULIN 3.8 10/11/2023    AGRATIO 1.4 10/07/2022    ANIONGAP 8 10/11/2023    OSMOCALC 291 10/11/2023

## 2024-03-08 RX ORDER — LISINOPRIL 20 MG/1
20 TABLET ORAL DAILY
Qty: 90 TABLET | Refills: 0 | Status: SHIPPED | OUTPATIENT
Start: 2024-03-08

## 2024-03-22 ENCOUNTER — OFFICE VISIT (OUTPATIENT)
Dept: FAMILY MEDICINE CLINIC | Facility: CLINIC | Age: 49
End: 2024-03-22
Payer: COMMERCIAL

## 2024-03-22 VITALS
WEIGHT: 161.25 LBS | SYSTOLIC BLOOD PRESSURE: 124 MMHG | BODY MASS INDEX: 27.53 KG/M2 | DIASTOLIC BLOOD PRESSURE: 78 MMHG | OXYGEN SATURATION: 97 % | HEART RATE: 59 BPM | RESPIRATION RATE: 16 BRPM | HEIGHT: 64 IN

## 2024-03-22 DIAGNOSIS — F41.9 ANXIETY: ICD-10-CM

## 2024-03-22 DIAGNOSIS — Z00.00 ROUTINE GENERAL MEDICAL EXAMINATION AT A HEALTH CARE FACILITY: Primary | ICD-10-CM

## 2024-03-22 DIAGNOSIS — I10 ESSENTIAL HYPERTENSION: ICD-10-CM

## 2024-03-22 PROCEDURE — 3074F SYST BP LT 130 MM HG: CPT | Performed by: FAMILY MEDICINE

## 2024-03-22 PROCEDURE — 99396 PREV VISIT EST AGE 40-64: CPT | Performed by: FAMILY MEDICINE

## 2024-03-22 PROCEDURE — 3008F BODY MASS INDEX DOCD: CPT | Performed by: FAMILY MEDICINE

## 2024-03-22 PROCEDURE — 3078F DIAST BP <80 MM HG: CPT | Performed by: FAMILY MEDICINE

## 2024-03-22 RX ORDER — TERBINAFINE HYDROCHLORIDE 250 MG/1
250 TABLET ORAL DAILY
Qty: 30 TABLET | Refills: 2 | Status: SHIPPED | OUTPATIENT
Start: 2024-03-22 | End: 2024-06-14

## 2024-03-22 NOTE — PROGRESS NOTES
Subjective:   Jacki Costello is a 49 year old female who presents for Routine Physical       History/Other:    Chief Complaint Reviewed and Verified  No Further Nursing Notes to   Review  Tobacco Reviewed  Allergies Reviewed  Medications Reviewed    Problem List Reviewed  Surgical History Reviewed  Family History   Reviewed         Tobacco:  She has never smoked tobacco.    Current Outpatient Medications   Medication Sig Dispense Refill    terbinafine (LAMISIL) 250 MG Oral Tab Take 1 tablet (250 mg total) by mouth daily. 30 tablet 2    ALPRAZolam 0.5 MG Oral Tab TAKE 1 TABLET BY MOUTH TWICE DAILY AS NEEDED 30 tablet 0    lisinopril 20 MG Oral Tab Take 1 tablet (20 mg total) by mouth daily. 90 tablet 0    verapamil  MG Oral Tab CR Take 1 tablet (240 mg total) by mouth daily. 90 tablet 1    rizatriptan 5 MG Oral Tablet Dispersible Take 1 tablet (5 mg total) by mouth as needed for Migraine. May repeat dose up to 3 times in a day. 30 tablet 3         Review of Systems:  Review of Systems   Constitutional: Negative.    HENT: Negative.     Eyes: Negative.    Respiratory: Negative.     Cardiovascular: Negative.    Gastrointestinal: Negative.    Genitourinary: Negative.    Musculoskeletal: Negative.    Skin: Negative.    Neurological: Negative.    Psychiatric/Behavioral: Negative.           Objective:   /78   Pulse 59   Resp 16   Ht 5' 4\" (1.626 m)   Wt 161 lb 4 oz (73.1 kg)   LMP 02/14/2016   SpO2 97%   BMI 27.68 kg/m²  Estimated body mass index is 27.68 kg/m² as calculated from the following:    Height as of this encounter: 5' 4\" (1.626 m).    Weight as of this encounter: 161 lb 4 oz (73.1 kg).  Physical Exam  Constitutional:       Appearance: Normal appearance. She is well-developed.   HENT:      Head: Normocephalic.      Right Ear: Hearing, tympanic membrane and ear canal normal.      Left Ear: Hearing, tympanic membrane and ear canal normal.      Nose: Nose normal.   Eyes:       Conjunctiva/sclera: Conjunctivae normal.      Pupils: Pupils are equal, round, and reactive to light.      Funduscopic exam:     Right eye: No hemorrhage or AV nicking.         Left eye: No hemorrhage or AV nicking.   Neck:      Thyroid: No thyroid mass or thyromegaly.   Cardiovascular:      Heart sounds: Normal heart sounds.   Pulmonary:      Breath sounds: Normal breath sounds.   Abdominal:      Tenderness: There is no abdominal tenderness.   Musculoskeletal:      Cervical back: Normal range of motion and neck supple.      Lumbar back: Normal.   Lymphadenopathy:      Upper Body:      Right upper body: No supraclavicular adenopathy.      Left upper body: No supraclavicular adenopathy.   Skin:     Comments: No suspicious findings waist up exam   Neurological:      Mental Status: She is alert and oriented to person, place, and time.      Sensory: No sensory deficit.      Deep Tendon Reflexes: Reflexes are normal and symmetric.   Psychiatric:         Mood and Affect: Mood is not anxious or depressed.           Assessment & Plan:   There are no diagnoses linked to this encounter.    No follow-ups on file.    ICD-10-CM    1. Routine general medical examination at a health care facility  Z00.00       2. Anxiety  F41.9       3. Essential hypertension  I10          CPM.  Discussed exercise.  She may take med for her nail fungus.  Labs recent enough with normal LFTs.   Rolan Ashraf, , 3/22/2024, 4:53 PM

## 2024-04-09 DIAGNOSIS — F41.9 ANXIETY: ICD-10-CM

## 2024-04-10 NOTE — TELEPHONE ENCOUNTER
Failed Protocol/No Protocol.    Requested Prescriptions   Pending Prescriptions Disp Refills    ALPRAZolam 0.5 MG Oral Tab 30 tablet 0     Sig: TAKE 1 TABLET BY MOUTH TWICE DAILY AS NEEDED       Controlled Substance Medication Failed - 4/9/2024  1:48 PM        Failed - This medication is a controlled substance - forward to provider to refill               Recent Outpatient Visits              2 weeks ago Routine general medical examination at a health care facility    [facility] Rolan Ashraf,     Office Visit    6 months ago Tendinitis of left rotator cuff    [facility] Reginaldo CARROLL, PT    Office Visit    8 months ago Tendinitis of left rotator cuff    [facility], [address] [place] [address] Vick Tinajero MD    Office Visit    9 months ago Closed nondisplaced fracture of proximal phalanx of right thumb, initial encounter    [facility] [place] Patel, Kushal, MD    Office Visit    9 months ago Injury of left rotator cuff, initial encounter    [address] Vick Tinajero MD    Office Visit

## 2024-04-11 RX ORDER — ALPRAZOLAM 0.5 MG/1
TABLET ORAL
Qty: 30 TABLET | Refills: 5 | Status: SHIPPED | OUTPATIENT
Start: 2024-04-11

## 2024-04-19 RX ORDER — RIZATRIPTAN BENZOATE 5 MG/1
5 TABLET, ORALLY DISINTEGRATING ORAL AS NEEDED
Qty: 30 TABLET | Refills: 3 | Status: SHIPPED | OUTPATIENT
Start: 2024-04-19

## 2024-04-19 NOTE — TELEPHONE ENCOUNTER
Refill passed per [facility] protocol.    Please review pended refill request as unable to refill due to high/very high interaction warning copied here:        High  Lactation Warning: rizatriptanNot recommended for Lactation  Patient lactation status is unknown  Details  Requested Prescriptions   Pending Prescriptions Disp Refills    rizatriptan 5 MG Oral Tablet Dispersible 30 tablet 3     Sig: Take 1 tablet (5 mg total) by mouth as needed for Migraine. May repeat dose up to 3 times in a day.       Neurology Medications Passed - 4/18/2024 12:47 PM        Passed - In person appointment or virtual visit in the past 6 mos or appointment in next 3 mos     Recent Outpatient Visits              4 weeks ago Routine general medical examination at a health care facility    [facility], [address], Rolan Guy,     Office Visit    6 months ago Tendinitis of left rotator cuff    [facility], Reginaldo CARROLL, PT    Office Visit    8 months ago Tendinitis of left rotator cuff    [address] Vick Tinajero MD    Office Visit    9 months ago Closed nondisplaced fracture of proximal phalanx of right thumb, initial encounter    [facility], [place] Patel, Kushal, MD    Office Visit    9 months ago Injury of left rotator cuff, initial encounter    [address] Vick Tinajero MD    Office Visit                         Recent Outpatient Visits              4 weeks ago Routine general medical examination at a health care facility    [facility], Rolan Guy DO    Office Visit    6 months ago Tendinitis of left rotator cuff    [facility], Reginaldo CARROLL, PT    Office Visit    8 months ago Tendinitis of left rotator cuff    [address],  Vick Armas MD    Office Visit    9 months ago Closed nondisplaced fracture of proximal phalanx of right thumb, initial encounter    [facility], [place] Patel, Kushal, MD    Office Visit    9 months ago Injury of left rotator cuff, initial encounter    [facility], [address] [place] [address], [place] Vick Tinajero MD    Office Visit

## 2024-06-11 RX ORDER — LISINOPRIL 20 MG/1
20 TABLET ORAL DAILY
Qty: 90 TABLET | Refills: 0 | OUTPATIENT
Start: 2024-06-11

## 2024-06-11 RX ORDER — LISINOPRIL 20 MG/1
20 TABLET ORAL DAILY
Qty: 90 TABLET | Refills: 3 | Status: SHIPPED | OUTPATIENT
Start: 2024-06-11

## 2024-06-11 NOTE — TELEPHONE ENCOUNTER
Refill Per Protocol   Please review pended refill request as unable to refill due to high/very high interaction warning copied here:    High  Lactation Warning: lisinoprilNot recommended for Lactation  Patient lactation status is unknown    Requested Prescriptions   Pending Prescriptions Disp Refills    lisinopril 20 MG Oral Tab 90 tablet 0     Sig: Take 1 tablet (20 mg total) by mouth daily.       Hypertension Medications Protocol Passed - 6/6/2024  2:48 PM        Passed - CMP or BMP in past 12 months        Passed - Last BP reading less than 140/90     BP Readings from Last 1 Encounters:   03/22/24 124/78               Passed - In person appointment or virtual visit in the past 12 mos or appointment in next 3 mos     Recent Outpatient Visits              2 months ago Routine general medical examination at a health care facility    [facility] [place]    Office Visit    8 months ago Tendinitis of left rotator cuff    [place]    Office Visit    10 months ago Tendinitis of left rotator cuff    [address] Vick Tinajero MD    Office Visit    11 months ago Closed nondisplaced fracture of proximal phalanx of right thumb, initial encounter    [facility], [place] Patel, Kushal, MD    Office Visit    11 months ago Injury of left rotator cuff, initial encounter    [address] Vick Tinajero MD    Office Visit                      Passed - EGFRCR or GFRNAA > 50     GFR Evaluation  EGFRCR: 68 , resulted on 10/11/2023                   Recent Outpatient Visits              2 months ago Routine general medical examination at a health care facility    [facility], Rolan Guy DO    Office Visit    8 months ago Tendinitis of left rotator cuff    [place]   [facility] [place], Reginaldo CARROLL, PT    Office Visit    10 months ago Tendinitis of left rotator cuff    [facility], [address] [place] [address] Vick Tinajero MD    Office Visit    11 months ago Closed nondisplaced fracture of proximal phalanx of right thumb, initial encounter    [facility], [facility], [place] Pat[place], Kushal, MD    Office Visit    11 months ago Injury of left rotator cuff, initial encounter    [facility], [address] [place] [address] Vick Tinajero MD    Office Visit

## 2024-06-19 ENCOUNTER — OFFICE VISIT (OUTPATIENT)
Dept: FAMILY MEDICINE CLINIC | Facility: CLINIC | Age: 49
End: 2024-06-19

## 2024-06-19 VITALS
DIASTOLIC BLOOD PRESSURE: 77 MMHG | TEMPERATURE: 98 F | WEIGHT: 152 LBS | BODY MASS INDEX: 27.97 KG/M2 | HEART RATE: 76 BPM | SYSTOLIC BLOOD PRESSURE: 110 MMHG | HEIGHT: 62 IN | RESPIRATION RATE: 16 BRPM

## 2024-06-19 DIAGNOSIS — J02.9 SORE THROAT: Primary | ICD-10-CM

## 2024-06-19 DIAGNOSIS — J02.0 STREP THROAT: ICD-10-CM

## 2024-06-19 LAB
CONTROL LINE PRESENT WITH A CLEAR BACKGROUND (YES/NO): YES YES/NO
KIT LOT #: ABNORMAL NUMERIC
STREP GRP A CUL-SCR: POSITIVE

## 2024-06-19 PROCEDURE — 87880 STREP A ASSAY W/OPTIC: CPT | Performed by: FAMILY MEDICINE

## 2024-06-19 PROCEDURE — 99213 OFFICE O/P EST LOW 20 MIN: CPT | Performed by: FAMILY MEDICINE

## 2024-06-19 PROCEDURE — 3078F DIAST BP <80 MM HG: CPT | Performed by: FAMILY MEDICINE

## 2024-06-19 PROCEDURE — 3008F BODY MASS INDEX DOCD: CPT | Performed by: FAMILY MEDICINE

## 2024-06-19 PROCEDURE — 3074F SYST BP LT 130 MM HG: CPT | Performed by: FAMILY MEDICINE

## 2024-06-19 RX ORDER — AMOXICILLIN 875 MG/1
875 TABLET, COATED ORAL 2 TIMES DAILY
Qty: 20 TABLET | Refills: 0 | Status: SHIPPED | OUTPATIENT
Start: 2024-06-19

## 2024-06-19 NOTE — PROGRESS NOTES
Subjective:   Patient ID: Jacki Costello is a 49 year old female.    Pt presents with some pain of the back of throat with eating.   No known sores. No known sick contacts.   Negative COVID test at home.  No fevers or acute illness.         History/Other:   Review of Systems   Constitutional:  Negative for fever.   HENT:  Positive for sore throat. Negative for congestion, ear pain, postnasal drip and rhinorrhea.    Respiratory:  Positive for cough (slight). Negative for shortness of breath.      Current Outpatient Medications   Medication Sig Dispense Refill    amoxicillin 875 MG Oral Tab Take 1 tablet (875 mg total) by mouth 2 (two) times daily. 20 tablet 0    lisinopril 20 MG Oral Tab Take 1 tablet (20 mg total) by mouth daily. 90 tablet 3    rizatriptan 5 MG Oral Tablet Dispersible Take 1 tablet (5 mg total) by mouth as needed for Migraine. May repeat dose up to 3 times in a day. 30 tablet 3    ALPRAZolam 0.5 MG Oral Tab TAKE 1 TABLET BY MOUTH TWICE DAILY AS NEEDED 30 tablet 5    verapamil  MG Oral Tab CR Take 1 tablet (240 mg total) by mouth daily. 90 tablet 1     Allergies:No Known Allergies    Objective:   Physical Exam  Vitals reviewed.   Constitutional:       Appearance: Normal appearance. She is well-developed.   HENT:      Right Ear: Tympanic membrane and ear canal normal.      Left Ear: Tympanic membrane and ear canal normal.      Mouth/Throat:      Mouth: Mucous membranes are moist.      Pharynx: No oropharyngeal exudate or posterior oropharyngeal erythema.   Cardiovascular:      Rate and Rhythm: Normal rate and regular rhythm.      Heart sounds: Normal heart sounds.   Pulmonary:      Effort: Pulmonary effort is normal. No respiratory distress.      Breath sounds: Normal breath sounds. No wheezing or rales.   Musculoskeletal:      Cervical back: Normal range of motion and neck supple.   Lymphadenopathy:      Cervical: Cervical adenopathy present.   Neurological:      Mental Status: She is  alert.         Assessment & Plan:   1. Sore throat /   2. Strep throat:  - Rapid strep positive. After discussion with patient, amoxicillin as directed To call if worse or not better;  provided; Follow up in one week if not resolved or as needed.          Orders Placed This Encounter   Procedures    POC Rapid Strep [24822]       Meds This Visit:  Requested Prescriptions     Signed Prescriptions Disp Refills    amoxicillin 875 MG Oral Tab 20 tablet 0     Sig: Take 1 tablet (875 mg total) by mouth 2 (two) times daily.       Imaging & Referrals:  None

## 2024-06-23 NOTE — TELEPHONE ENCOUNTER
[facility] Phone: [phone]   Information given to pt for [facility] to see if they have a sooner appt. Pt verbalized understanding. 4 (moderate pain)

## 2024-07-22 DIAGNOSIS — F41.9 ANXIETY: ICD-10-CM

## 2024-07-24 RX ORDER — VERAPAMIL HYDROCHLORIDE 240 MG/1
240 TABLET, FILM COATED, EXTENDED RELEASE ORAL DAILY
Qty: 90 TABLET | Refills: 3 | Status: SHIPPED | OUTPATIENT
Start: 2024-07-24

## 2024-07-24 RX ORDER — ALPRAZOLAM 0.5 MG/1
TABLET ORAL
Qty: 30 TABLET | Refills: 0 | OUTPATIENT
Start: 2024-07-24

## 2024-07-24 NOTE — TELEPHONE ENCOUNTER
Refill passes per [facility] protocol.    No future appointments.  LAST OFFICE VISIT: 6/19/2024    Requested Prescriptions   Pending Prescriptions Disp Refills    VERAPAMIL  MG Oral Tab CR [Pharmacy Med Name: VERAPAMIL ER TAB [id number]] 90 tablet 1     Sig: TAKE 1 TABLET DAILY       Hypertension Medications Protocol Passed - 7/22/2024 12:00 AM        Passed - CMP or BMP in past 12 months        Passed - Last BP reading less than 140/90     BP Readings from Last 1 Encounters:   06/19/24 110/77               Passed - In person appointment or virtual visit in the past 12 mos or appointment in next 3 mos     Recent Outpatient Visits              1 month ago Sore throat    [facility] Андрей Bautista MD    Office Visit    4 months ago Routine general medical examination at a health care facility    [facility], [place] Rolan Ashraf DO    Office Visit    9 months ago Tendinitis of left rotator cuff    [place]  [facility] [place] Hutch[place], Reginaldo CARROLL, PT    Office Visit    11 months ago Tendinitis of left rotator cuff    [address] Vick Tinajero MD    Office Visit    1 year ago Closed nondisplaced fracture of proximal phalanx of right thumb, initial encounter    [facility], [place] Patel, Kushal, MD    Office Visit                      Passed - EGFRCR or GFRNAA > 50     GFR Evaluation  EGFRCR: 68 , resulted on 10/11/2023           Refused Prescriptions Disp Refills    ALPRAZolam 0.5 MG Oral Tab [Pharmacy Med Name: ALPRAZOLAM TAB 0.5MG] 30 tablet 0     Sig: TAKE 1 TABLET 2 TIMES DAILYAS NEEDED       Controlled Substance Medication Failed - 7/22/2024 12:00 AM        Failed - This medication is a controlled substance - forward to provider to refill               Recent Outpatient Visits              1 month ago Sore throat    [facility]  [facility] Андрей Bautista MD    Office Visit    4 months ago Routine general medical examination at a health care facility    [facility] Rolan Ashraf DO    Office Visit    9 months ago Tendinitis of left rotator cuff    [place]  [facility], Reginaldo CARROLL, PT    Office Visit    11 months ago Tendinitis of left rotator cuff    [facility], [address] [place] [address] Vick Tinajero MD    Office Visit    1 year ago Closed nondisplaced fracture of proximal phalanx of right thumb, initial encounter    [facility] [place] Anuj Batista MD    Office Visit

## 2024-09-12 DIAGNOSIS — F41.9 ANXIETY: ICD-10-CM

## 2024-09-17 RX ORDER — ALPRAZOLAM 0.5 MG
TABLET ORAL
Qty: 30 TABLET | Refills: 5 | Status: SHIPPED | OUTPATIENT
Start: 2024-09-17

## 2024-09-17 NOTE — TELEPHONE ENCOUNTER
Alprazolam was written on 04/11/2024 for a quantity of 30 with 5 refills Sig:   TAKE 1 TABLET BY MOUTH TWICE DAILY AS NEEDED     Dispense history : 08/12/2024 07/22/2024 06/17/2024  Last written            04/11/2024  Patient is due  Recent Visits  Date Type Provider Dept   06/19/24 Office Visit Андрей Bautista MD [facility]   03/22/24 Office Visit Rolan Ashraf DO [facility]   No future appointments.

## 2024-10-03 ENCOUNTER — PATIENT MESSAGE (OUTPATIENT)
Dept: FAMILY MEDICINE CLINIC | Facility: CLINIC | Age: 49
End: 2024-10-03

## 2024-10-03 DIAGNOSIS — Z12.31 BREAST CANCER SCREENING BY MAMMOGRAM: Primary | ICD-10-CM

## 2024-10-03 NOTE — TELEPHONE ENCOUNTER
Pt's last bilateral mammo screen was 11/9/22. Because she was not compliant with getting them annually, per protocol, I cannot provide her with one for 2024. I did pend for you review.    Copied/pasted from the 11/9/22 report    CONCLUSION:   There is no mammographic evidence of malignancy in either breast. As long as patient's clinical breast exam remains unchanged, annual screening mammogram is recommended.      BI-RADS CATEGORY:     DIAGNOSTIC CATEGORY 1--NEGATIVE ASSESSMENT.       RECOMMENDATIONS:   ROUTINE MAMMOGRAM AND CLINICAL EVALUATION IN 12 MONTHS.         Rolan Ashraf, DO  11/20/2022  9:22 PM CST       Normal mammogram results.

## 2024-10-03 NOTE — TELEPHONE ENCOUNTER
From: Jacki Costello  To: Rolan Ashraf  Sent: 10/3/2024 4:22 PM CDT  Subject: Mammogram    Do I need an order to schedule a mammogram?

## 2024-10-07 ENCOUNTER — NURSE TRIAGE (OUTPATIENT)
Dept: FAMILY MEDICINE CLINIC | Facility: CLINIC | Age: 49
End: 2024-10-07

## 2024-10-07 NOTE — TELEPHONE ENCOUNTER
Action Requested: Summary for Provider     []  Critical Lab, Recommendations Needed  [] Need Additional Advice  []   FYI    []   Need Orders  [] Need Medications Sent to Pharmacy  []  Other   - taking Cepacol lozenges, Ibuprofen and staying hydrated  ,advised to swab for Covid and call back if positive   stated her son was seen today at [facility]  Dx- Positive  Mono-  son was advised if anyone with symptoms should be tested for Mono   Pt requesting Mono test     Reason for call: Sore Throat  Onset: 2 days                    Reason for Disposition   Patient wants to be seen   Sore throat    Protocols used: Sore Throat-A-[place]

## 2024-10-07 NOTE — TELEPHONE ENCOUNTER
Mono is not found in older patients over 30 or 40.  Low that this would be mono.  Most of us have all been exposed in our life and immune.

## 2024-10-24 ENCOUNTER — LAB ENCOUNTER (OUTPATIENT)
Dept: LAB | Age: 49
End: 2024-10-24
Payer: COMMERCIAL

## 2024-10-24 DIAGNOSIS — E55.9 VITAMIN D DEFICIENCY: ICD-10-CM

## 2024-10-24 DIAGNOSIS — Z00.00 ROUTINE PHYSICAL EXAMINATION: ICD-10-CM

## 2024-10-24 LAB
ALBUMIN SERPL-MCNC: 4.5 G/DL (ref 3.2–4.8)
ALBUMIN/GLOB SERPL: 1.6 {RATIO} (ref 1–2)
ALP LIVER SERPL-CCNC: 66 U/L
ALT SERPL-CCNC: 15 U/L
ANION GAP SERPL CALC-SCNC: 8 MMOL/L (ref 0–18)
AST SERPL-CCNC: 20 U/L (ref ?–34)
BASOPHILS # BLD AUTO: 0.06 X10(3) UL (ref 0–0.2)
BASOPHILS NFR BLD AUTO: 0.9 %
BILIRUB SERPL-MCNC: 0.8 MG/DL (ref 0.3–1.2)
BUN BLD-MCNC: 12 MG/DL (ref 9–23)
BUN/CREAT SERPL: 11.7 (ref 10–20)
CALCIUM BLD-MCNC: 10 MG/DL (ref 8.7–10.4)
CHLORIDE SERPL-SCNC: 106 MMOL/L (ref 98–112)
CHOLEST SERPL-MCNC: 200 MG/DL (ref ?–200)
CO2 SERPL-SCNC: 27 MMOL/L (ref 21–32)
CREAT BLD-MCNC: 1.03 MG/DL
DEPRECATED RDW RBC AUTO: 43.7 FL (ref 35.1–46.3)
EGFRCR SERPLBLD CKD-EPI 2021: 66 ML/MIN/1.73M2 (ref 60–?)
EOSINOPHIL # BLD AUTO: 0.14 X10(3) UL (ref 0–0.7)
EOSINOPHIL NFR BLD AUTO: 2.2 %
ERYTHROCYTE [DISTWIDTH] IN BLOOD BY AUTOMATED COUNT: 13 % (ref 11–15)
FASTING PATIENT LIPID ANSWER: YES
FASTING STATUS PATIENT QL REPORTED: YES
GLOBULIN PLAS-MCNC: 2.8 G/DL (ref 2–3.5)
GLUCOSE BLD-MCNC: 87 MG/DL (ref 70–99)
HCT VFR BLD AUTO: 39.7 %
HDLC SERPL-MCNC: 72 MG/DL (ref 40–59)
HGB BLD-MCNC: 13.8 G/DL
IMM GRANULOCYTES # BLD AUTO: 0.01 X10(3) UL (ref 0–1)
IMM GRANULOCYTES NFR BLD: 0.2 %
LDLC SERPL CALC-MCNC: 112 MG/DL (ref ?–100)
LYMPHOCYTES # BLD AUTO: 2.02 X10(3) UL (ref 1–4)
LYMPHOCYTES NFR BLD AUTO: 31 %
MCH RBC QN AUTO: 32.1 PG (ref 26–34)
MCHC RBC AUTO-ENTMCNC: 34.8 G/DL (ref 31–37)
MCV RBC AUTO: 92.3 FL
MONOCYTES # BLD AUTO: 0.52 X10(3) UL (ref 0.1–1)
MONOCYTES NFR BLD AUTO: 8 %
NEUTROPHILS # BLD AUTO: 3.76 X10 (3) UL (ref 1.5–7.7)
NEUTROPHILS # BLD AUTO: 3.76 X10(3) UL (ref 1.5–7.7)
NEUTROPHILS NFR BLD AUTO: 57.7 %
NONHDLC SERPL-MCNC: 128 MG/DL (ref ?–130)
OSMOLALITY SERPL CALC.SUM OF ELEC: 291 MOSM/KG (ref 275–295)
PLATELET # BLD AUTO: 204 10(3)UL (ref 150–450)
POTASSIUM SERPL-SCNC: 3.8 MMOL/L (ref 3.5–5.1)
PROT SERPL-MCNC: 7.3 G/DL (ref 5.7–8.2)
RBC # BLD AUTO: 4.3 X10(6)UL
SODIUM SERPL-SCNC: 141 MMOL/L (ref 136–145)
TRIGL SERPL-MCNC: 90 MG/DL (ref 30–149)
TSI SER-ACNC: 2.56 MIU/ML (ref 0.55–4.78)
VIT D+METAB SERPL-MCNC: 47.5 NG/ML (ref 30–100)
VLDLC SERPL CALC-MCNC: 15 MG/DL (ref 0–30)
WBC # BLD AUTO: 6.5 X10(3) UL (ref 4–11)

## 2024-10-24 PROCEDURE — 36415 COLL VENOUS BLD VENIPUNCTURE: CPT

## 2024-10-24 PROCEDURE — 82306 VITAMIN D 25 HYDROXY: CPT

## 2024-10-24 PROCEDURE — 80053 COMPREHEN METABOLIC PANEL: CPT

## 2024-10-24 PROCEDURE — 85025 COMPLETE CBC W/AUTO DIFF WBC: CPT

## 2024-10-24 PROCEDURE — 84443 ASSAY THYROID STIM HORMONE: CPT

## 2024-10-24 PROCEDURE — 80061 LIPID PANEL: CPT

## 2024-11-27 ENCOUNTER — HOSPITAL ENCOUNTER (OUTPATIENT)
Dept: MAMMOGRAPHY | Age: 49
Discharge: HOME | End: 2024-11-27
Attending: FAMILY MEDICINE
Payer: COMMERCIAL

## 2024-11-27 DIAGNOSIS — Z12.31 BREAST CANCER SCREENING BY MAMMOGRAM: ICD-10-CM

## 2024-11-27 PROCEDURE — 77067 SCR MAMMO BI INCL CAD: CPT | Performed by: FAMILY MEDICINE

## 2024-11-27 PROCEDURE — 77063 BREAST TOMOSYNTHESIS BI: CPT | Performed by: FAMILY MEDICINE

## 2025-02-26 ENCOUNTER — OFFICE VISIT (OUTPATIENT)
Dept: OBGYN CLINIC | Facility: CLINIC | Age: 50
End: 2025-02-26
Payer: COMMERCIAL

## 2025-02-26 VITALS — DIASTOLIC BLOOD PRESSURE: 85 MMHG | WEIGHT: 158 LBS | BODY MASS INDEX: 29 KG/M2 | SYSTOLIC BLOOD PRESSURE: 125 MMHG

## 2025-02-26 DIAGNOSIS — N95.1 MENOPAUSAL SYMPTOMS: Primary | ICD-10-CM

## 2025-02-26 PROCEDURE — 3074F SYST BP LT 130 MM HG: CPT | Performed by: STUDENT IN AN ORGANIZED HEALTH CARE EDUCATION/TRAINING PROGRAM

## 2025-02-26 PROCEDURE — 3079F DIAST BP 80-89 MM HG: CPT | Performed by: STUDENT IN AN ORGANIZED HEALTH CARE EDUCATION/TRAINING PROGRAM

## 2025-02-26 PROCEDURE — 99203 OFFICE O/P NEW LOW 30 MIN: CPT | Performed by: STUDENT IN AN ORGANIZED HEALTH CARE EDUCATION/TRAINING PROGRAM

## 2025-02-26 RX ORDER — VENLAFAXINE HYDROCHLORIDE 37.5 MG/1
37.5 CAPSULE, EXTENDED RELEASE ORAL DAILY
Qty: 90 CAPSULE | Refills: 0 | Status: SHIPPED | OUTPATIENT
Start: 2025-02-26 | End: 2025-05-27

## 2025-02-26 NOTE — PROGRESS NOTES
[facility]  Obstetrics and Gynecology  Gyne Problem Visit      Jacki Costello is a 50 year old female  is a new patient to me presenting with concerns of worsening menopausal symptoms. States she has been experiencing symptoms for the last month. Reports sweating excessively at night and difficulty sleeping. Feels more irritable and emotional. Symptoms cause her to have to stay home from work. Reports history of migraines, has not had one in the last 6 months. History of brain aneurysm. No personal history cardiovascular disease or breast cancer. No blood clotting disorders or tobacco use.     Patient's last menstrual period was 2016.     Pap:   Contraception:hysterectomy    OBSTETRICS HISTORY:  OB History    Para Term  AB Living   2 2 2 0 0 2   SAB IAB Ectopic Multiple Live Births   0 0 0 0 2       GYNE HISTORY:  Pap Date: 14   Menarche: 9 y/o (2025 10:54 AM)  Use of Birth Control (if yes, specify type): Hysterectomy (2025 10:54 AM)  Pap Date: 14 (2025 10:54 AM)         No data to display                  History   Sexual Activity    Sexual activity: Not on file       MEDICAL HISTORY:  Past Medical History:   Diagnosis Date    Abnormal uterine bleeding 2015    Anxiety 2012    Blood transfusion reaction 1999    Brain aneurysm (HCC)     neuro clipping    Decorative tattoo     Dysmenorrhea 2014    Essential hypertension     High blood pressure     HYPERTENSION     Hypertension 1999    Lipid screening 2011    per NG    Menometrorrhagia     endometrial ablation HTA     Menorrhagia     embx neg, cervical polyp 2013    Migraine headache with aura     Migraines     Pinched nerve     in back    Seizure disorder (HCC)     Transfusion history 1999    Vulval hematoma      x 2 (vulva hematoma x 1)     Past Surgical History:   Procedure Laterality Date    Appendectomy      Appendectomy      Colonoscopy N/A  2019    Procedure: COLONOSCOPY;  Surgeon: Spencer Moser MD;  Location: [facility] ENDOSCOPY    Colposcopy, cervix w/upper adjacent vagina; w/biopsy(s), cervix      Endometrial ablation      HTA    Hysterectomy  2016    [facility]    Hysteroscopy  2010    essure placement    Laparoscopic appendectomy  11/15/2021    Leep             x 2 (vulva hematoma x 1)    Other surgical history      anuerism clip    Other surgical history      embx neg, cervical polyp (menorrhagia)    Total abdom hysterectomy         SOCIAL HISTORY:  Social History     Socioeconomic History    Marital status:      Spouse name: Not on file    Number of children: Not on file    Years of education: Not on file    Highest education level: Not on file   Occupational History    Not on file   Tobacco Use    Smoking status: Never    Smokeless tobacco: Never   Vaping Use    Vaping status: Never Used   Substance and Sexual Activity    Alcohol use: Yes     Alcohol/week: 5.0 standard drinks of alcohol     Types: 3 Glasses of wine, 2 Standard drinks or equivalent per week     Comment: OCC    Drug use: No    Sexual activity: Not on file   Other Topics Concern     Service Not Asked    Blood Transfusions Not Asked    Caffeine Concern Yes     Comment: coffee, soda, 1 cup daily    Occupational Exposure Not Asked    Hobby Hazards Not Asked    Sleep Concern Not Asked    Stress Concern Not Asked    Weight Concern Not Asked    Special Diet Not Asked    Back Care Not Asked    Exercise No     Comment: None    Bike Helmet Not Asked    Seat Belt Not Asked    Self-Exams Not Asked   Social History Narrative    The patient does not use an assistive device..      The patient does live in a home with stairs.     Social Drivers of Health     Food Insecurity: Not on file   Transportation Needs: Not on file   Stress: Not on file   Housing Stability: Not on file       MEDICATIONS:    Current Outpatient Medications:     venlafaxine ER 37.5 MG Oral  Capsule SR 24 Hr, Take 1 capsule (37.5 mg total) by mouth daily., Disp: 90 capsule, Rfl: 0    ALPRAZolam 0.5 MG Oral Tab, TAKE 1 TABLET BY MOUTH TWICE DAILY AS NEEDED, Disp: 30 tablet, Rfl: 5    verapamil  MG Oral Tab CR, Take 1 tablet (240 mg total) by mouth daily., Disp: 90 tablet, Rfl: 3    amoxicillin 875 MG Oral Tab, Take 1 tablet (875 mg total) by mouth 2 (two) times daily., Disp: 20 tablet, Rfl: 0    lisinopril 20 MG Oral Tab, Take 1 tablet (20 mg total) by mouth daily., Disp: 90 tablet, Rfl: 3    rizatriptan 5 MG Oral Tablet Dispersible, Take 1 tablet (5 mg total) by mouth as needed for Migraine. May repeat dose up to 3 times in a day., Disp: 30 tablet, Rfl: 3    ALLERGIES:  Allergies[1]      REVIEW OF SYSTEMS:  Review of Systems   Constitutional:  Positive for diaphoresis. Negative for chills, fever and unexpected weight change.   Respiratory: Negative.     Cardiovascular: Negative.    Gastrointestinal:  Negative for abdominal pain, constipation, diarrhea and nausea.   Genitourinary:  Negative for dyspareunia, dysuria, genital sores, hematuria, menstrual problem, pelvic pain, vaginal bleeding, vaginal discharge and vaginal pain.   Musculoskeletal: Negative.    Skin: Negative.    Neurological: Negative.    Hematological: Negative.    Psychiatric/Behavioral:  Positive for agitation and sleep disturbance.        PHYSICAL EXAM:  /85   Wt 158 lb (71.7 kg)   LMP 02/14/2016   BMI 28.90 kg/m²     GENERAL: well developed, well nourished, in no apparent distress     ASSESSMENT:       ICD-10-CM    1. Menopausal symptoms  N95.1           Plan:  Discussed risks and benefits of  hormone therapy including treatment of hot flashes and night sweats, vaginal dryness and pain with intercourse, prevention of bone loss and osteoporosis and sleep disturbances due to night sweats.  There is a slight increased risk of stroke or blood clots in the legs or lungs (especially if the estrogen is taken in a pill form) and  breast cancer with long term use (greater than 5 years). Progestins eliminate any increased risk of endometrial cancer in patients with a uterus.   Studies suggest that hormone replacement therapy may be good for your heart if started within 10 years of menopause. Side effects may include breast tenderness and irregular bleeding or spotting and these should be reported to your provider if they occur.  Hormone therapy is not associated with weight gain and may reduce the risk of developing diabetes.   Given patient's migraine with aura history, will first trial Effexor 37.5 ER for the next 3 months. RTC in 3 months for annual and follow-up.    Requested Prescriptions     Signed Prescriptions Disp Refills    venlafaxine ER 37.5 MG Oral Capsule SR 24 Hr 90 capsule 0     Sig: Take 1 capsule (37.5 mg total) by mouth daily.       GERI CAGLE PA-C  11:01 AM  2/26/2025        Spent total time 20 minutes on obtaining history / chart review, evaluating patient / performing medically appropriate exam, discussing treatment options, counseling / educating, and completing documentation, coordinating care.         [1] No Known Allergies

## 2025-03-03 ENCOUNTER — PATIENT MESSAGE (OUTPATIENT)
Dept: OBGYN CLINIC | Facility: CLINIC | Age: 50
End: 2025-03-03

## 2025-03-06 RX ORDER — CITALOPRAM HYDROBROMIDE 10 MG/1
10 TABLET ORAL DAILY
Qty: 30 TABLET | Refills: 2 | Status: SHIPPED | OUTPATIENT
Start: 2025-03-06 | End: 2025-06-04

## 2025-03-15 DIAGNOSIS — F41.9 ANXIETY: ICD-10-CM

## 2025-03-19 RX ORDER — ALPRAZOLAM 0.5 MG
TABLET ORAL
Qty: 30 TABLET | Refills: 5 | Status: CANCELLED | OUTPATIENT
Start: 2025-03-19

## 2025-03-19 NOTE — TELEPHONE ENCOUNTER
Please call patient to assist in making an appointment. Thank you     Patient overdue for an annual physical exam: 3/22/24  Last office visit: Problem/sick visit 6/19/24    **Please attempt all available phone numbers in patient's chart. This includes alternative numbers and contacts on patient's release of information. Thank you.

## 2025-03-19 NOTE — TELEPHONE ENCOUNTER
Please kindly review this medication    [x] FAILS PROTOCOL            [x] Controlled Substance             [] Medication not previously prescribed by Provider            [x] Due for appointment- no future appointment scheduled            [] BP reading            [] Labs            [] Depression Screening            [] Asthma (ACT recorded/ACT score)    [] HAS NO PROTOCOL ATTACHED     Streamfilet message sent to patient to schedule an office visit with primary care provider.  Routed to Call Center to call patient and make an appointment.    Recent fill dates: 2/11/25, 1/14/25, and 12/15/24  Date of  last written prescription: 9/17/24   Last written quantity: #30 each and processed as a 15 day supply  Last office visit: 6/19/24 (problem/sick visit with Dr. Bautista)  Last physical exam: 3/22/24  [x] Takes as needed  [] Takes scheduled daily

## 2025-03-20 ENCOUNTER — OFFICE VISIT (OUTPATIENT)
Dept: FAMILY MEDICINE CLINIC | Facility: CLINIC | Age: 50
End: 2025-03-20
Payer: COMMERCIAL

## 2025-03-20 VITALS
OXYGEN SATURATION: 97 % | RESPIRATION RATE: 18 BRPM | SYSTOLIC BLOOD PRESSURE: 102 MMHG | BODY MASS INDEX: 29.44 KG/M2 | DIASTOLIC BLOOD PRESSURE: 67 MMHG | WEIGHT: 160 LBS | HEIGHT: 62 IN | TEMPERATURE: 98 F | HEART RATE: 67 BPM

## 2025-03-20 DIAGNOSIS — I67.1 BRAIN ANEURYSM (HCC): ICD-10-CM

## 2025-03-20 DIAGNOSIS — F41.9 ANXIETY: ICD-10-CM

## 2025-03-20 DIAGNOSIS — Z00.00 ROUTINE GENERAL MEDICAL EXAMINATION AT A HEALTH CARE FACILITY: Primary | ICD-10-CM

## 2025-03-20 PROCEDURE — 3008F BODY MASS INDEX DOCD: CPT | Performed by: FAMILY MEDICINE

## 2025-03-20 PROCEDURE — 3078F DIAST BP <80 MM HG: CPT | Performed by: FAMILY MEDICINE

## 2025-03-20 PROCEDURE — 3074F SYST BP LT 130 MM HG: CPT | Performed by: FAMILY MEDICINE

## 2025-03-20 PROCEDURE — 99396 PREV VISIT EST AGE 40-64: CPT | Performed by: FAMILY MEDICINE

## 2025-03-20 RX ORDER — ALPRAZOLAM 0.5 MG
TABLET ORAL
Qty: 30 TABLET | Refills: 5 | Status: SHIPPED | OUTPATIENT
Start: 2025-03-20

## 2025-03-21 ENCOUNTER — TELEPHONE (OUTPATIENT)
Dept: FAMILY MEDICINE CLINIC | Facility: CLINIC | Age: 50
End: 2025-03-21

## 2025-03-21 DIAGNOSIS — I67.1 BRAIN ANEURYSM (HCC): Primary | ICD-10-CM

## 2025-03-21 NOTE — TELEPHONE ENCOUNTER
OK.  Where would I find that? It should be performed same location she had in the past if possible.

## 2025-03-21 NOTE — TELEPHONE ENCOUNTER
Previously performed at [facility] on 06/28/2018--> test is IR ANGIOGRAM CAROTID CEREBRAL BILATERAL     Dr. Ashraf - order pended, please review order in it's entirety for accuracy, then sign if appropriate and cancel the other order for IR Angiogram placed yesterday, 3/20/25. Thank you.

## 2025-03-21 NOTE — TELEPHONE ENCOUNTER
Rep [facility] stated order placed for an angiogram per García,NP - stated Order need to be directed to [facility] because that type is not performed there

## 2025-03-24 ENCOUNTER — OFFICE VISIT (OUTPATIENT)
Dept: FAMILY MEDICINE CLINIC | Facility: CLINIC | Age: 50
End: 2025-03-24
Payer: COMMERCIAL

## 2025-03-24 ENCOUNTER — NURSE TRIAGE (OUTPATIENT)
Dept: FAMILY MEDICINE CLINIC | Facility: CLINIC | Age: 50
End: 2025-03-24

## 2025-03-24 VITALS
WEIGHT: 161 LBS | SYSTOLIC BLOOD PRESSURE: 122 MMHG | HEART RATE: 59 BPM | BODY MASS INDEX: 29.63 KG/M2 | DIASTOLIC BLOOD PRESSURE: 82 MMHG | TEMPERATURE: 98 F | HEIGHT: 62 IN

## 2025-03-24 DIAGNOSIS — B30.9 VIRAL CONJUNCTIVITIS: ICD-10-CM

## 2025-03-24 DIAGNOSIS — J06.9 UPPER RESPIRATORY TRACT INFECTION, UNSPECIFIED TYPE: Primary | ICD-10-CM

## 2025-03-24 PROCEDURE — 3074F SYST BP LT 130 MM HG: CPT | Performed by: FAMILY MEDICINE

## 2025-03-24 PROCEDURE — 99213 OFFICE O/P EST LOW 20 MIN: CPT | Performed by: FAMILY MEDICINE

## 2025-03-24 PROCEDURE — 3008F BODY MASS INDEX DOCD: CPT | Performed by: FAMILY MEDICINE

## 2025-03-24 PROCEDURE — 3079F DIAST BP 80-89 MM HG: CPT | Performed by: FAMILY MEDICINE

## 2025-03-24 RX ORDER — AZELASTINE 1 MG/ML
2 SPRAY, METERED NASAL 2 TIMES DAILY
Qty: 1 EACH | Refills: 0 | Status: SHIPPED | OUTPATIENT
Start: 2025-03-24

## 2025-03-24 NOTE — TELEPHONE ENCOUNTER
Action Requested: Summary for Provider     []  Critical Lab, Recommendations Needed  [] Need Additional Advice  []   FYI    []   Need Orders  [] Need Medications Sent to Pharmacy  [x]  Other     SUMMARY: Patient calling in, cold symptoms since last Thursday, last night developed eye crusting and closed this morning due to dried up discharge. Able to apply warm wash cloth to get eye open and a bit better now. Patient says \" I think I have pick eye\" More in one eye than both but \"can feel it coming on in the other eye\". Stayed home from work today  Going to  do home covid testing as well due to this and other symptoms and call back if positive, will change visit to video.    Future Appointments   Date Time Provider Department Center   3/24/2025 10:30 AM Kameron Roa DO [facility]   5/28/2025 11:45 AM Ricarda Dunlap PA-C [facility]       Reason for call: Eye Problem  Onset: overnight but URI cold symptoms last Thursday       Reason for Disposition   Eyelid (outer) is very red and painful (or tender to touch)    Protocols used: Eye - Pus or [id number]

## 2025-03-24 NOTE — PATIENT INSTRUCTIONS
Take a warm moist paper towel and apply this to the eyes 3-4 times daily for 5 minutes at a time.  Also get some Refresh Tears over-the-counter and you can do 2 to 3 drops in each eye 3-4 times daily to help wash out the eye .    The Astelin nasal sprays prescription and I sent this to your pharmacy.  2 sprays in each nostril twice daily at breakfast and then at dinner.

## 2025-03-24 NOTE — PROGRESS NOTES
Patient ID: Jacki Costello is a 50 year old female.         The following individual(s) verbally consented to be recorded using ambient AI listening technology and understand that they can each withdraw their consent to this listening technology at any point by asking the clinician to turn off or pause the recording:    Patient name: Jacki Costello  Additional names:      Eye Problem   Associated symptoms include an eye discharge.   Sore Throat   Associated symptoms include congestion and coughing. Pertinent negatives include no shortness of breath.     Chief Complaint   Patient presents with    Eye Problem     Left eye redness/ crusty x 2 days    Sore Throat     X 5 days    Runny Nose     X 5 days     Action Requested: Summary for Provider      []  Critical Lab, Recommendations Needed  [] Need Additional Advice  []   FYI    []   Need Orders  [] Need Medications Sent to Pharmacy  [x]  Other      SUMMARY: Patient calling in, cold symptoms since last Thursday, last night developed eye crusting and closed this morning due to dried up discharge. Able to apply warm wash cloth to get eye open and a bit better now. Patient says \" I think I have pick eye\" More in one eye than both but \"can feel it coming on in the other eye\". Stayed home from work today  Going to  do home covid testing as well due to this and other symptoms and call back if positive, will change visit to video.            Future Appointments   Date Time Provider Department Center   3/24/2025 10:30 AM Kameron Roa DO [facility]   5/28/2025 11:45 AM Ricarda Dunlap PA-C [facility]         Reason for call: Eye Problem  Onset: overnight but URI cold symptoms last Thursday         Reason for Disposition   Eyelid (outer) is very red and painful (or tender to touch)    Protocols used: Eye - Pus or [id number]    This is the first time patient is seen by me. Patient usually sees Dr. Ashraf.    Pt c/o cold symptoms since  3/20/2025. This morning she woke up with left eye crusting, although can feel the right beginning to water. She is still having rhinorrhea,nasal congestion, and cough. Positive post nasal drip. Pt does not wear contacts. I discussed with her.   History of Present Illness  Jacki Costello is a 50 year old female who presents with cold symptoms and eye crusting.    She has been experiencing eye crusting since last night, which caused her left eye to be closed this morning due to dried discharge. A warm washcloth was used to open the eye, which improved the condition. The right eye has started to water slightly.    She continues to experience a runny nose and cough. No contact lens use. She feels mucus going down the back of her throat.    She has been using warm compresses and feels better after showering. She has Refresh Tears at home and plans to use them as needed.        Wt Readings from Last 6 Encounters:   03/24/25 161 lb   03/20/25 160 lb   02/26/25 158 lb   06/19/24 152 lb   03/22/24 161 lb 4 oz   07/11/23 165 lb       BMI Readings from Last 6 Encounters:   03/24/25 29.45 kg/m²   03/20/25 29.26 kg/m²   02/26/25 28.90 kg/m²   06/19/24 27.80 kg/m²   03/22/24 27.68 kg/m²   07/11/23 28.32 kg/m²       BP Readings from Last 6 Encounters:   03/24/25 122/82   03/20/25 102/67   02/26/25 125/85   06/19/24 110/77   03/22/24 124/78   06/18/23 121/76         Review of Systems   HENT:  Positive for congestion, postnasal drip and rhinorrhea.    Eyes:  Positive for discharge.   Respiratory:  Positive for cough. Negative for shortness of breath.    Cardiovascular:  Negative for chest pain.           Medical History:      Past Medical History:    Abnormal uterine bleeding    Anxiety    Blood transfusion reaction    Brain aneurysm (HCC)    neuro clipping    Decorative tattoo    Dysmenorrhea    Essential hypertension    High blood pressure    HYPERTENSION    Hypertension    Lipid screening    per NG    Menometrorrhagia     endometrial ablation HTA     Menorrhagia    embx neg, cervical polyp 2013    Migraine headache with aura    Migraines    Pinched nerve    in back    Seizure disorder (HCC)    Transfusion history    Vulval hematoma     x 2 (vulva hematoma x 1)       Past Surgical History:   Procedure Laterality Date    Appendectomy      Appendectomy      Colonoscopy N/A 2019    Procedure: COLONOSCOPY;  Surgeon: Spencer Moser MD;  Location: [facility] ENDOSCOPY    Colposcopy, cervix w/upper adjacent vagina; w/biopsy(s), cervix      Endometrial ablation      HTA    Hysterectomy  2016    [facility]    Hysteroscopy  2010    essure placement    Laparoscopic appendectomy  11/15/2021    Leep             x 2 (vulva hematoma x 1)    Other surgical history      anuerism clip    Other surgical history      embx neg, cervical polyp (menorrhagia)    Total abdom hysterectomy            Current Outpatient Medications   Medication Sig Dispense Refill    ALPRAZolam 0.5 MG Oral Tab TAKE 1 TABLET BY MOUTH TWICE DAILY AS NEEDED 30 tablet 5    citalopram 10 MG Oral Tab Take 1 tablet (10 mg total) by mouth daily. 30 tablet 2    verapamil  MG Oral Tab CR Take 1 tablet (240 mg total) by mouth daily. 90 tablet 3    lisinopril 20 MG Oral Tab Take 1 tablet (20 mg total) by mouth daily. 90 tablet 3    rizatriptan 5 MG Oral Tablet Dispersible Take 1 tablet (5 mg total) by mouth as needed for Migraine. May repeat dose up to 3 times in a day. 30 tablet 3     Allergies:Allergies[1]     Physical Exam:       Physical Exam  Blood pressure 122/82, pulse 59, temperature 97.7 °F (36.5 °C), temperature source Tympanic, height 5' 2\" (1.575 m), weight 161 lb, last menstrual period 2016, unknown if currently breastfeeding.    Physical Exam   Constitutional: Patient is oriented to person, place, and time. Patient appears well-developed and well-nourished. No distress.   Head: Normocephalic.   Right Ear: Tympanic membrane, external ear  and ear canal normal.   Left Ear: Tympanic membrane, external ear and ear canal normal.   Nose: No mucosal edema or rhinorrhea.   Throat: Oropharynx is clear without exudate   Eyes: Conjunctivae and EOM are normal. No mass or corneal abrasion. She has a tiny amount of green mucus at the medial canthus bilaterally.  No swelling of the eyelids.  There is no swelling of the face.  Neck: Normal range of motion. No thyromegaly present.   Cardiovascular: Normal rate, regular rhythm and normal heart sounds.    Pulmonary/Chest: Effort normal and breath sounds normal. No respiratory distress.   Lymphadenopathy: Mild submandibular lymphadenopathy bilaterally.   Neurological: Patient is alert and oriented to person, place, and time.   Skin: Skin is warm.   Psychiatry: Normal mood and affect.    Vitals reviewed.           Assessment/Plan:      Diagnoses and all orders for this visit:    Upper respiratory tract infection, unspecified type  -     azelastine 0.1 % Nasal Solution; 2 sprays by Nasal route 2 (two) times daily. Squeeze nose after sprays and do not snort it back into throat.  Patient Instructions   Take a warm moist paper towel and apply this to the eyes 3-4 times daily for 5 minutes at a time.  Also get some Refresh Tears over-the-counter and you can do 2 to 3 drops in each eye 3-4 times daily to help wash out the eye .    The Astelin nasal sprays prescription and I sent this to your pharmacy.  2 sprays in each nostril twice daily at breakfast and then at dinner.    Viral conjunctivitis  -     azelastine 0.1 % Nasal Solution; 2 sprays by Nasal route 2 (two) times daily. Squeeze nose after sprays and do not snort it back into throat.      Assessment & Plan  Upper Respiratory Infection (URI)  Symptoms consistent with URI, including rhinorrhea, cough, and post-nasal drip. Mild bilateral submandibular lymphadenopathy present, common with URI. Nasal congestion contributing to ocular symptoms.  - Prescribe Astelin nasal  spray, two sprays in each nostril twice daily at breakfast and dinner.  - Advise against using the nasal spray at bedtime to prevent post-nasal drip.  - Instruct to contact the office via MyChart if symptoms worsen by Wednesday or Thursday.    Viral Conjunctivitis  Presents with symptoms of viral conjunctivitis, including eye crusting and discharge, primarily affecting the left eye with some involvement of the right eye. Likely viral due to bilateral involvement and associated with URI. Does not wear contact lenses, reducing risk of complications. Highly contagious; advised on hygiene measures to prevent spread.  - Apply warm compresses to the eyes three to four times daily for five minutes at a time.  - Use Refresh Tears over-the-counter, two to three drops in each eye three to four times daily.  - Educate on hand hygiene to prevent the spread of infection.        Referrals (if applicable)  No orders of the defined types were placed in this encounter.      Follow up if symptoms persist.  Take medicine (if given) as prescribed.  Approach to treatment discussed and patient/family member understands and agrees to plan.     No follow-ups on file.    Patient Instructions   Take a warm moist paper towel and apply this to the eyes 3-4 times daily for 5 minutes at a time.  Also get some Refresh Tears over-the-counter and you can do 2 to 3 drops in each eye 3-4 times daily to help wash out the eye .    The Astelin nasal sprays prescription and I sent this to your pharmacy.  2 sprays in each nostril twice daily at breakfast and then at dinner.    Kalyani Gonzales    3/24/2025    By signing my name below, IKalyani,  attest that this documentation has been prepared under the direction and in the presence of Kameron Roa DO.   Electronically Signed: Kalyani Gonzales, 3/24/2025, 10:28 AM.    I, Kameron Roa DO,  personally performed the services described in this documentation. All medical record entries made  by the scribe were at my direction and in my presence.  I have reviewed the chart and discharge instructions (if applicable) and agree that the record reflects my personal performance and is accurate and complete.  Kameron Roa DO, 3/24/2025, 12:55 PM                  [1] No Known Allergies

## 2025-03-26 ENCOUNTER — PATIENT MESSAGE (OUTPATIENT)
Dept: FAMILY MEDICINE CLINIC | Facility: CLINIC | Age: 50
End: 2025-03-26

## 2025-03-26 ENCOUNTER — TELEPHONE (OUTPATIENT)
Dept: FAMILY MEDICINE CLINIC | Facility: CLINIC | Age: 50
End: 2025-03-26

## 2025-03-26 DIAGNOSIS — B30.9 VIRAL CONJUNCTIVITIS: Primary | ICD-10-CM

## 2025-03-26 NOTE — TELEPHONE ENCOUNTER
Please advise.    The patient stated she was to call on Wednesday if not any better.  She stated she is 100% worse than Monday.  She continues to have a non productive cough. Stuffy nose, chest congestion, post nasal drip, sore throat and her eyes have been swollen shut when waking up.   During the day she uses wet compresses and eye drops.    Denies fever, chest pains, shortness of breath.    Her main concern is her eye having drainage.  She does not know the color.    Was seen on 3/24/25.    From 3/26/25 patient message.  Jacki Hurtado Rn Triage (supporting Kameron Roa DO)7 hours ago (8:26 AM)       Hello - My condition is not getting any better since Monday.  It has actually gotten worse each day since Monday.  Both eyes are continuing to discharge, especially at night.  I also continue to have cold symptoms.

## 2025-03-27 RX ORDER — GENTAMICIN SULFATE 3 MG/ML
1 SOLUTION/ DROPS OPHTHALMIC 4 TIMES DAILY
Qty: 1 EACH | Refills: 0 | Status: SHIPPED | OUTPATIENT
Start: 2025-03-27 | End: 2025-04-03

## 2025-03-27 NOTE — TELEPHONE ENCOUNTER
Inform her I sent in the Garamycin eyedrops to use as directed for 1 week for the conjunctivitis but for the other symptoms she may need to see Dr. Ashraf for the cough, congestion, etc.

## 2025-03-27 NOTE — TELEPHONE ENCOUNTER
Patient calling back, eyes are still main concern.  She is saying crusting and waking up during the night to eye swollen and crusted shut.   Asking want is next steps, patient was to call and provide update yesterday which she did but no response, following up today.     Using warm compresses, no pain, eyes are itchy scratchy feeling. Both eyes effected.   Dr. Roa did see this patient for symptoms and concerns, routing to him for review and further recommendation    I did ask and advise patient, may need to see eye MD  She has one Dr. Gomez (sp?) that she follows with, she may call them to check in and see if they want to see her or have recommendation but she is waiting for call back from us on this.

## 2025-03-31 ENCOUNTER — OFFICE VISIT (OUTPATIENT)
Dept: FAMILY MEDICINE CLINIC | Facility: CLINIC | Age: 50
End: 2025-03-31
Payer: COMMERCIAL

## 2025-03-31 ENCOUNTER — NURSE TRIAGE (OUTPATIENT)
Dept: FAMILY MEDICINE CLINIC | Facility: CLINIC | Age: 50
End: 2025-03-31

## 2025-03-31 VITALS
OXYGEN SATURATION: 97 % | DIASTOLIC BLOOD PRESSURE: 76 MMHG | WEIGHT: 156 LBS | HEART RATE: 61 BPM | BODY MASS INDEX: 28.71 KG/M2 | TEMPERATURE: 98 F | HEIGHT: 62 IN | SYSTOLIC BLOOD PRESSURE: 122 MMHG

## 2025-03-31 DIAGNOSIS — J02.9 SORE THROAT: Primary | ICD-10-CM

## 2025-03-31 LAB
CONTROL LINE PRESENT WITH A CLEAR BACKGROUND (YES/NO): YES YES/NO
KIT EXPIRATION DATE: NORMAL DATE
KIT LOT #: NORMAL NUMERIC
STREP GRP A CUL-SCR: NEGATIVE

## 2025-03-31 PROCEDURE — 87880 STREP A ASSAY W/OPTIC: CPT | Performed by: FAMILY MEDICINE

## 2025-03-31 PROCEDURE — 3078F DIAST BP <80 MM HG: CPT | Performed by: FAMILY MEDICINE

## 2025-03-31 PROCEDURE — 3074F SYST BP LT 130 MM HG: CPT | Performed by: FAMILY MEDICINE

## 2025-03-31 PROCEDURE — 99213 OFFICE O/P EST LOW 20 MIN: CPT | Performed by: FAMILY MEDICINE

## 2025-03-31 PROCEDURE — 3008F BODY MASS INDEX DOCD: CPT | Performed by: FAMILY MEDICINE

## 2025-03-31 NOTE — TELEPHONE ENCOUNTER
Called patient (name and  verified) and instructed on providers message below. Patient verbalized understanding and agrees to plan.    Future Appointments   Date Time Provider Department Center   3/31/2025  5:00 PM Rolan Ashraf DO [facility]   2025 11:45 AM Ricarda Dunlap PA-C [facility]

## 2025-03-31 NOTE — PROGRESS NOTES
Subjective:   Jacki Costello is a 50 year old female who presents for Sore Throat (1 week now )       History/Other:   History of Present Illness  Jacki Costello is a 50 year old female who presents with a sore throat and recent history of conjunctivitis.    She has been experiencing a sore throat, which she describes as feeling 'a little red.' She is the only one sick at home and notes that she felt like she was getting sick about two weeks ago. The sore throat developed after her eye symptoms improved.    Initially, she experienced symptoms of conjunctivitis, which began approximately two weeks ago. Her eyes were 'closed shut' for about three days. She consulted with Dr. Balderrama, who initially did not prescribe drops, anticipating the condition would clear up on its own. However, when it did not improve, she was prescribed antibiotic drops, which successfully resolved the conjunctivitis by the following Friday.    She mentions a previous CT scan of the head, which was a follow-up on something seen during a regular CT. She recalls having a CTA of the brain done in 2017 at [place], which was related to an aneurysm check. She has had carotid studies done in the past but indicates that the focus should be on the aneurysm.   Chief Complaint Reviewed and Verified  Nursing Notes Reviewed and   Verified  Allergies Reviewed  Medications Reviewed       Tobacco:  She has never smoked tobacco.    Current Outpatient Medications   Medication Sig Dispense Refill    gentamicin 0.3 % Ophthalmic Solution Place 1 drop into both eyes 4 (four) times daily for 7 days. For the conjunctivitis. 1 each 0    azelastine 0.1 % Nasal Solution 2 sprays by Nasal route 2 (two) times daily. Squeeze nose after sprays and do not snort it back into throat. 1 each 0    ALPRAZolam 0.5 MG Oral Tab TAKE 1 TABLET BY MOUTH TWICE DAILY AS NEEDED 30 tablet 5    citalopram 10 MG Oral Tab Take 1 tablet (10 mg total) by mouth daily. 30 tablet 2     verapamil  MG Oral Tab CR Take 1 tablet (240 mg total) by mouth daily. 90 tablet 3    lisinopril 20 MG Oral Tab Take 1 tablet (20 mg total) by mouth daily. 90 tablet 3    rizatriptan 5 MG Oral Tablet Dispersible Take 1 tablet (5 mg total) by mouth as needed for Migraine. May repeat dose up to 3 times in a day. 30 tablet 3              Review of Systems:  Pertinent items are noted in HPI.  Ears, nose, mouth, throat, and face: positive for sore throat      Objective:   /76   Pulse 61   Temp 98.4 °F (36.9 °C)   Ht 5' 2\" (1.575 m)   Wt 156 lb (70.8 kg)   LMP 02/14/2016   SpO2 97%   BMI 28.53 kg/m²  Estimated body mass index is 28.53 kg/m² as calculated from the following:    Height as of this encounter: 5' 2\" (1.575 m).    Weight as of this encounter: 156 lb (70.8 kg).  Results  RADIOLOGY  CT of the head: Follow-up on findings from previous CT  CTA of the brain: Performed at [place] (2017)     Physical Exam  HEENT: Throat erythematous.      Assessment & Plan:   1. Sore throat (Primary)  -     Strep A Assay W/Optic    Assessment & Plan  Pharyngitis  Presents with a sore throat, red upon examination, following an episode of conjunctivitis. Differential diagnosis includes viral and streptococcal pharyngitis. A strep test is being conducted to determine if the cause is bacterial, necessitating antibiotic treatment. If negative, the condition may be viral, and symptomatic treatment will be considered. Informed consent includes understanding that if the strep test is positive, antibiotics will be necessary to prevent complications such as rheumatic fever.  - Perform strep test  - If strep test is positive, initiate antibiotic therapy  - If strep test is negative, consider symptomatic treatment    Conjunctivitis  Experienced conjunctivitis with symptoms of eyes being closed shut for three days. Initial conservative management was followed by antibiotic drops, leading to symptom resolution. The condition  was likely bacterial given the response to antibiotic drops. Informed consent included understanding that antibiotic drops can provide anti-inflammatory effects, aiding in symptom resolution.    Intracranial Aneurysm  Intracranial aneurysm requiring monitoring. Previous CT scan of the head was conducted, and a follow-up is necessary to assess the aneurysm. Previous imaging was done at [place], and it is important to have the new imaging done at the same location for comparison. Medical decision making involved ensuring the correct imaging order was placed to monitor the aneurysm rather than carotid issues.  - Order CT angiography (CTA) of the brain at [place]        No follow-ups on file.        Rolan Ashraf DO, 3/31/2025, 5:44 PM

## 2025-03-31 NOTE — TELEPHONE ENCOUNTER
Action Requested: Summary for Provider     []  Critical Lab, Recommendations Needed  [] Need Additional Advice  []   FYI    []   Need Orders  [] Need Medications Sent to Pharmacy  []  Other     SUMMARY:   Reason for call: Sore Throat  Onset: a week ago.     Sore throat 'really hurts\".     Last week started with cold symptoms and pink eye.   When swallow, she coughs.    Patient was seen and treated for pink eye. Feels better.     Patient has been taking ibuprofen for sore throat.   Neg home covid test.      Patient is scheduled with Dr Ashraf; re-evaluate.     Reason for Disposition   Sore throat with cough/cold symptoms present > 5 days    Protocols used: Sore Throat-A-OH

## 2025-05-06 ENCOUNTER — HOSPITAL ENCOUNTER (OUTPATIENT)
Dept: CT IMAGING | Facility: HOSPITAL | Age: 50
Discharge: HOME | End: 2025-05-06
Attending: FAMILY MEDICINE
Payer: COMMERCIAL

## 2025-05-06 DIAGNOSIS — I67.1 BRAIN ANEURYSM (HCC): ICD-10-CM

## 2025-05-06 LAB
CREAT BLD-MCNC: 1 MG/DL (ref 0.55–1.02)
EGFRCR SERPLBLD CKD-EPI 2021: 68 ML/MIN/1.73M2 (ref 60–?)

## 2025-05-06 PROCEDURE — 70496 CT ANGIOGRAPHY HEAD: CPT | Performed by: FAMILY MEDICINE

## 2025-05-06 PROCEDURE — 82565 ASSAY OF CREATININE: CPT

## 2025-05-28 ENCOUNTER — OFFICE VISIT (OUTPATIENT)
Dept: OBGYN CLINIC | Facility: CLINIC | Age: 50
End: 2025-05-28

## 2025-05-28 VITALS
HEART RATE: 56 BPM | DIASTOLIC BLOOD PRESSURE: 62 MMHG | BODY MASS INDEX: 29.01 KG/M2 | HEIGHT: 62 IN | SYSTOLIC BLOOD PRESSURE: 95 MMHG | WEIGHT: 157.63 LBS

## 2025-05-28 DIAGNOSIS — Z12.31 SCREENING MAMMOGRAM, ENCOUNTER FOR: ICD-10-CM

## 2025-05-28 DIAGNOSIS — N95.1 HOT FLASHES DUE TO MENOPAUSE: ICD-10-CM

## 2025-05-28 DIAGNOSIS — N76.0 VAGINITIS AND VULVOVAGINITIS: ICD-10-CM

## 2025-05-28 DIAGNOSIS — Z01.419 ENCOUNTER FOR WELL WOMAN EXAM: Primary | ICD-10-CM

## 2025-05-28 PROCEDURE — 3008F BODY MASS INDEX DOCD: CPT | Performed by: STUDENT IN AN ORGANIZED HEALTH CARE EDUCATION/TRAINING PROGRAM

## 2025-05-28 PROCEDURE — 3074F SYST BP LT 130 MM HG: CPT | Performed by: STUDENT IN AN ORGANIZED HEALTH CARE EDUCATION/TRAINING PROGRAM

## 2025-05-28 PROCEDURE — 99396 PREV VISIT EST AGE 40-64: CPT | Performed by: STUDENT IN AN ORGANIZED HEALTH CARE EDUCATION/TRAINING PROGRAM

## 2025-05-28 PROCEDURE — 3078F DIAST BP <80 MM HG: CPT | Performed by: STUDENT IN AN ORGANIZED HEALTH CARE EDUCATION/TRAINING PROGRAM

## 2025-05-28 RX ORDER — ESTRADIOL 0.03 MG/D
1 PATCH TRANSDERMAL WEEKLY
Qty: 4 PATCH | Refills: 3 | Status: SHIPPED | OUTPATIENT
Start: 2025-05-28

## 2025-05-28 NOTE — PATIENT INSTRUCTIONS
Guidelines for Vulvar Skin Care    NOTE: The goal is to promote healthy vulvar skin. This is done by decreasing and removing chemicals, moisture, or rubbing (friction). Products listed below have been suggested for use because of their past success in helping to decrease or relieve vulvar/ vaginal burning, irritation and itching.    LAUNDRY PRODUCTS  1. Use a detergent free of dyes, enzymes and perfumes (such as ALL-Free and Clear) on all clothing. Use 1/3 to 1/2 the suggested amount per load.  2. Do not use a fabric softener in the washer or dryer, even those advertised as \"free\".  If you use a shared dryer or laundromat, you must hang dry your underwear, towels, and    any other clothing that come in contact with your vulva.   3. Stain removing products (including bleach). Soak and rinse in clear water all           underwear and towels on which you have used a stain-removing product. Then wash in your regular washing cycle. This removes as much of the product as possible.  White vinegar, 1/4 - 1/3 cup per laundry load, can be used to freshen clothing and remove oils.     CLOTHING  1. Wear white all cotton underwear, not nylon with a cotton crotch. Cotton allows air in and moisture out. Do not wear underwear when sleeping at night. Loose fitting boxers or pajama bottoms are fine.  2. Avoid pantyhose. If you must wear them, either cut out the tyrone crotch (if you cut   out the crotch, be sure to leave about 1/4 to 1/2 inch of fabric from the seam to prevent       running), or wear thigh high hose. Many stores now carry thigh high nylons.   3. Avoid tight clothing, especially clothing made of synthetic fabrics. Remove wet                 bathing and exercise clothing as soon as you can.    BATHING AND HYGIENE  1. Do not use bath soaps, lotions, gels, etc., which contain perfumes. These may       smell nice but can be irritating. This includes many baby products and feminine hygiene   products marked \"gentle\" or  \"mild\". Dove for Sensitive Skin, Neutrogena, Basis,    Aveeno, or Pears are the soaps we suggest. Do not use soap directly on the vulvar skin.               Just warm water and your hand will keep the vulvar area clean without irritating the skin.  2. Do not use bubble bath, bath salts and scented oils. You may apply a neutral (unscented, non-perfumed) oil or lotion to damp skin after getting out of the tub or shower. Do not apply lotion directly to the vulva.  3. Do not scrub vulvar skin with a washcloth. Washing with your hand and warm water is enough for good cleaning.  4. Pat dry rather than rubbing with a towel. Or use a hairdryer on a cool setting to dry the vulva.  5. Baking Soda soaks. Soak in lukewarm (not hot) bath water with 4-5 tablespoons of baking soda to help soothe vulvar itching and burning. Soak 1 to 3 times a day for 5-10 minutes. If you are using a sitz bath, use 1-2 teaspoons of baking soda.  6. Use white, unscented toilet paper. If paper has a perfumed scent or lotion, avoid using it.  7. Do not use feminine hygiene sprays, perfumes, adult, or baby wipes. If urine causes burning of the skin, pour lukewarm water over the vulva while urinating. Pat dry rather than wiping.  8. Do not use deodorized pads and tampons. Tampons may be used when the blood flow is heavy enough to soak one tampon in four hours or less. Tampons are safe for most women, but wearing them too long or when the blood flow is light may result in vaginal infection, increased discharge, odor, or toxic shock syndrome. Also, use only pads that have a cotton liner that comes in contact with your skin.  9. Do not use over the counter creams or ointments, except A&D or zinc oxide ointment. Ask your health provider first. When buying ointments, be sure that they are paraben and fragrance-free.  10. Small amounts of A&D Ointment, olive oil, vegetable oil or zinc oxide may be applied to your vulva as often as needed to protect the skin.  These products also help to decrease skin irritation during your period and when you urinate. If wearing wool causes you to itch, do not use A&D ointment, as it contains lanolin.  11. DO NOT DOUCHE. Baking soda soaks or rinsing with warm water will help rinse away extra discharge and help with odor.  12. DO NOT SHAVE or use hair removing products on the vulvar area. You may use scissors to trim the pubic hair close to the vulva.  13. Some women may have problems with chronic dampness. Keeping dry is important.   Do not wear pads on a daily basis.  Choose cotton fabrics whenever you can.  Keep an extra pair of underwear with you in a small bag and change if you become damp during the day at work/school.  Gold Bond Powder or Zeosorb Powder may be applied to the vulva and groin area one to two times per day to help absorb moisture. Do not use powders that contain cornstarch.   14. Using a lubricant may help dryness and irritation during intercourse. Use a small amount of a pure vegetable oil (solid or liquid) or olive oil. These oils contain no chemicals to irritate vulvar/vaginal skin. Also, these oils will rinse away with water and will not increase your chances of infection. Water-based lubricants tend to dry out before intercourse is over and may also contain chemicals that can irritate your vulvar skin. It may be helpful to use a non-lubricated, non-spermicidal condom, and use vegetable oil as the lubricant. This will help keep the semen off the skin, which can decrease burning and irritation after intercourse.    BIRTH CONTROL OPTIONS   1. The new low-dose oral birth control pills do not increase your chances of getting a yeast infection.   2. Lubricated condoms, contraceptive jellies, creams, or sponges may cause itching and burning. Ask your health care provider for help.   3. The use of latex condoms with a vegetable oil as a lubricant (#14 above) is suggested to protect your skin. Petroleum-based lubricants  may affect the integrity of condoms when used for birth control or prevention of sexually transmitted diseases. Our experience has not found this to be a problem with vegetable-based oils. However, the Centers for Disease Control recommends that condoms not be used with any oil based lubricants for birth control or prevention of sexually transmitted disease.

## 2025-05-28 NOTE — PROGRESS NOTES
[facility]  Obstetrics and Gynecology  Annual  Ricarda Dunlap PA-C      The following individual(s) verbally consented to be recorded using ambient AI listening technology and understand that they can each withdraw their consent to this listening technology at any point by asking the clinician to turn off or pause the recording:    Patient name: Jacki Costello is a 50 year old female  presenting today for her annual well woman exam.     History of Present Illness  Jacki Costello is a 50 year old female who presents with concerns about hot flashes and vaginal dryness.    She discontinued venlafaxine due to side effects, including loss of appetite and excessive sleepiness. Citalopram initially alleviated her hot flashes, but they have returned, particularly at night, though less severe. Vaginal dryness occurs especially during intercourse, with no discharge or odor. She is considering management options for these symptoms. Her medical history includes a hysterectomy for heavy periods and fibroids. Recent CT scan for history of brain aneurysm was normal. She has no history of blood clotting disorders, heart disease, cervical cancer, or abnormal HPV.    Patient's last menstrual period was 2016.     Pap:   Contraception:hysterectomy  Mammo: 2024  Colonoscopy: 2019    OBSTETRICS HISTORY:     OB History    Para Term  AB Living   2 2 2   2   SAB IAB Ectopic Multiple Live Births       2      # Outcome Date GA Lbr Andriy/2nd Weight Sex Type Anes PTL Lv   2 Term 02    M Vag-Spont   KARIS   1 Term 00    F Vag-Spont   KARIS       GYNE HISTORY:     Menarche: 9 y/o (2025 10:54 AM)  Use of Birth Control (if yes, specify type): Hysterectomy (2025 10:54 AM)  Pap Date: 14 (2025 10:54 AM)      History   Sexual Activity    Sexual activity: Not on file            No data to display                  MEDICAL HISTORY:     Past Medical  History[1]   Past Surgical History[2]    SOCIAL HISTORY:     Tobacco Use: Low Risk  (5/28/2025)    Patient History     Smoking Tobacco Use: Never     Smokeless Tobacco Use: Never     Passive Exposure: Not on file       Depression Screening:   Depression Screening (PHQ-2/PHQ-9): Over the LAST 2 WEEKS                        FAMILY HISTORY:     Family History[3]    MEDICATIONS:     Medications - Current[4]    ALLERGIES:     Allergies[5]    REVIEW OF SYSTEMS:     Review of Systems      PHYSICAL EXAM:     Vitals:    05/28/25 1142   BP: 95/62   Pulse: 56   Weight: 157 lb 9.6 oz (71.5 kg)   Height: 5' 2\" (1.575 m)       Body mass index is 28.83 kg/m².     Chaperone offered, pt declined.    Constitutional: well developed, well nourished  Psychiatric:  Oriented to time, place, person and situation. Appropriate mood and affect  Head/Face: normocephalic  Neck/Thyroid: thyroid symmetric, no thyromegaly, no nodules, no adenopathy  Abdomen:  soft, nontender, nondistended, no masses  Skin/Hair: no unusual rashes or bruises  Extremities: no edema, no cyanosis    Pelvic Exam:  External Genitalia: normal appearance, hair distribution, and no lesions  Urethral Meatus:  normal in size, location, without lesions and prolapse  Bladder:  No fullness, masses or tenderness  Vagina:  Normal appearance without lesions, no abnormal discharge  Cervix and uterus surgically absent  Adnexa: normal without masses or tenderness  Perineum: normal  Anus: no hemorroids       ASSESMENT & PLAN:     Assessment & Plan  Vaginal dryness  Reports dryness during intercourse. Discussed non-hormonal and hormonal treatments, recommending moisturizers first.  - Provide information on supplements for vaginal dryness and decreased libido.  - Recommend KY jelly liquid beads or Replens vaginal moisturizer.  - Consider topical estrogen cream if moisturizers are ineffective.    Hot flashes  Recurrent hot flashes. Transition to estrogen patch discussed due to  hysterectomy and normal CT scan. Estrogen patch deemed safe.  - Discontinue Celexa.  - Prescribe estrogen patch. Monitor for side effects such as breast tenderness or headaches.    General Health Maintenance  Routine health maintenance discussed. Mammogram order placed.  - Order mammogram.      SUMMARY:  Pap: No  BCM:  Hysterectomy  STD screening: No  Mammogram: ordered placed   updated    ORDERS:     Orders Placed This Encounter   Procedures    Vaginitis Vaginosis PCR Panel     PRESCRIPTIONS:     Requested Prescriptions     Signed Prescriptions Disp Refills    estradiol 0.025 MG/24HR Transdermal Patch Weekly 4 patch 3     Sig: Place 1 patch onto the skin once a week.     IMAGING/ REFERRALS:    [facility] PAULETTE 2D+3D SCREENING BILAT (CPT=77067/29093)   FOLLOW-UP     No follow-ups on file.    GERI CAGLE PA-C  11:58 AM  5/28/2025    Note to patient and family:  The 21st Century Cures Act makes medical notes available to patients in the interest of transparency.  However, please be advised that this is a medical document.  It is intended as a peer to peer communication.  It is written in medical language and may contain abbreviations or verbiage that are technical and unfamiliar.  It may appear blunt or direct.  Medical documents are intended to carry relevant information, facts as evident, and the clinical opinion of the practitioner.         [1]   Past Medical History:  Diagnosis Date    Abnormal uterine bleeding 07/01/2015    Anxiety 01/01/2012    Blood transfusion reaction 08/17/1999    Brain aneurysm (HCC) 1999    neuro clipping    Constipation     Decorative tattoo     Dysmenorrhea 01/01/2014    Essential hypertension     Hemorrhoids     High blood pressure     History of blood transfusion 08/17/1999    HYPERTENSION     Hypertension 01/01/1999    Lipid screening 09/14/2011    per NG    Menometrorrhagia     endometrial ablation HTA 2013    Menorrhagia     embx neg, cervical polyp 5/2013    Migraine headache with  aura     Migraines     Pinched nerve     in back    Seizure disorder (HCC)     Transfusion history 1999    Vulval hematoma      x 2 (vulva hematoma x 1)   [2]   Past Surgical History:  Procedure Laterality Date    Appendectomy      Appendectomy      Colonoscopy N/A 2019    Procedure: COLONOSCOPY;  Surgeon: Spencer Moser MD;  Location: [facility] ENDOSCOPY    Colposcopy, cervix w/upper adjacent vagina; w/biopsy(s), cervix      Endometrial ablation      HTA    Hysterectomy  2016    [facility]    Hysteroscopy  2010    essure placement    Laparoscopic appendectomy  11/15/2021    Leep             x 2 (vulva hematoma x 1)    Other surgical history      anuerism clip    Other surgical history      embx neg, cervical polyp (menorrhagia)    Total abdom hysterectomy     [3]   Family History  Problem Relation Age of Onset    Pancreatic Cancer Father     Cancer Father         pancreatic    Infectious Disease Maternal Grandmother         \"mad cow disease\"    Colon Cancer Maternal Uncle     Cancer Daughter     Breast Cancer Neg    [4]   Current Outpatient Medications:     estradiol 0.025 MG/24HR Transdermal Patch Weekly, Place 1 patch onto the skin once a week., Disp: 4 patch, Rfl: 3    azelastine 0.1 % Nasal Solution, 2 sprays by Nasal route 2 (two) times daily. Squeeze nose after sprays and do not snort it back into throat., Disp: 1 each, Rfl: 0    ALPRAZolam 0.5 MG Oral Tab, TAKE 1 TABLET BY MOUTH TWICE DAILY AS NEEDED, Disp: 30 tablet, Rfl: 5    citalopram 10 MG Oral Tab, Take 1 tablet (10 mg total) by mouth daily., Disp: 30 tablet, Rfl: 2    verapamil  MG Oral Tab CR, Take 1 tablet (240 mg total) by mouth daily., Disp: 90 tablet, Rfl: 3    lisinopril 20 MG Oral Tab, Take 1 tablet (20 mg total) by mouth daily., Disp: 90 tablet, Rfl: 3    rizatriptan 5 MG Oral Tablet Dispersible, Take 1 tablet (5 mg total) by mouth as needed for Migraine. May repeat dose up to 3 times in a day., Disp: 30  tablet, Rfl: 3  [5] No Known Allergies

## 2025-05-29 LAB
BV BACTERIA DNA VAG QL NAA+PROBE: NEGATIVE
C GLABRATA DNA VAG QL NAA+PROBE: NEGATIVE
C KRUSEI DNA VAG QL NAA+PROBE: NEGATIVE
CANDIDA DNA VAG QL NAA+PROBE: NEGATIVE
T VAGINALIS DNA VAG QL NAA+PROBE: NEGATIVE

## 2025-06-24 ENCOUNTER — OFFICE VISIT (OUTPATIENT)
Dept: DERMATOLOGY CLINIC | Facility: CLINIC | Age: 50
End: 2025-06-24
Payer: COMMERCIAL

## 2025-06-24 DIAGNOSIS — D22.9 MULTIPLE BENIGN NEVI: ICD-10-CM

## 2025-06-24 DIAGNOSIS — L81.4 LENTIGINES: ICD-10-CM

## 2025-06-24 DIAGNOSIS — D18.01 CHERRY ANGIOMA: ICD-10-CM

## 2025-06-24 DIAGNOSIS — L82.1 SEBORRHEIC KERATOSES: Primary | ICD-10-CM

## 2025-06-24 PROCEDURE — 99203 OFFICE O/P NEW LOW 30 MIN: CPT | Performed by: STUDENT IN AN ORGANIZED HEALTH CARE EDUCATION/TRAINING PROGRAM

## 2025-06-24 NOTE — PROGRESS NOTES
New patient     Referred by:   No referring provider defined for this encounter.     CHIEF COMPLAINT: FBSE    HISTORY OF PRESENT ILLNESS: .    - No particular lesions of concern.       DERM HISTORY:  History of skin cancer: No  History of melanoma: No    FAMILY HISTORY:  History of melanoma: No    PAST MEDICAL HISTORY:  Past Medical History[1]    REVIEW OF SYSTEMS:  Constitutional: Denies fever, chills, unintentional weight loss.   Skin as per HPI    Medications  Current Medications[2]    PHYSICAL EXAM:    General: awake, alert, no acute distress  Skin: Skin exam was performed today including the following: head and face, scalp, neck, chest (including breasts and axillae), abdomen, back, bilateral upper extremities, bilateral lower extremities, hands, feet, digits, nails. Pertinent findings include:   - Scattered bright red-purple dome-shaped papules on the trunk and extremities   - Scattered light brown stellate macules on sun exposed sites  - Scattered, evenly colored, round brown macules and papules with regular borders on the trunk and extremities  - Numerous scattered skin-colored and brown, waxy, stuck-on papules and plaques on the trunk and extremities      ASSESSMENT & PLAN:  Pathophysiology of diagnoses discussed with patient.  Therapeutic options reviewed. Risks, benefits, and alternatives discussed with patient. Instructions reviewed at length.    #Lentigines  #Seborrheic keratoses   #Cherry angiomas   - Reassurance provided regarding the benign nature of these lesions.    #Multiple benign nevi  - Complete skin exam performed today with no outlier lesions identified   - Reassured patient of benign nature of these lesions.   - Recommend daily photoprotection with broad-spectrum sunscreen, avoidance of sun during peak hours, and sun protective clothing.    - Dermoscopy was used for physical examination of pigmented lesions during today's office visit.      Return to clinic: 2-3 years  or sooner if something  concerning arises     Adam Darby MD         [1]   Past Medical History:   Abnormal uterine bleeding    Anxiety    Blood transfusion reaction    Brain aneurysm (HCC)    neuro clipping    Constipation    Decorative tattoo    Dysmenorrhea    Essential hypertension    Hemorrhoids    High blood pressure    History of blood transfusion    HYPERTENSION    Hypertension    Lipid screening    per NG    Menometrorrhagia    endometrial ablation 2013    Menorrhagia    embx neg, cervical polyp 2013    Migraine headache with aura    Migraines    Pinched nerve    in back    Seizure disorder (HCC)    Transfusion history    Vulval hematoma     x 2 (vulva hematoma x 1)   [2]   Current Outpatient Medications   Medication Sig Dispense Refill    estradiol (ESTRACE) 0.1 MG/GM Vaginal Cream Place 1 g vaginally every evening for 7 days, THEN 1 g twice a week. 42.5 g 2    estradiol 0.025 MG/24HR Transdermal Patch Weekly Place 1 patch onto the skin once a week. 4 patch 3    azelastine 0.1 % Nasal Solution 2 sprays by Nasal route 2 (two) times daily. Squeeze nose after sprays and do not snort it back into throat. 1 each 0    ALPRAZolam 0.5 MG Oral Tab TAKE 1 TABLET BY MOUTH TWICE DAILY AS NEEDED 30 tablet 5    verapamil  MG Oral Tab CR Take 1 tablet (240 mg total) by mouth daily. 90 tablet 3    lisinopril 20 MG Oral Tab Take 1 tablet (20 mg total) by mouth daily. 90 tablet 3    rizatriptan 5 MG Oral Tablet Dispersible Take 1 tablet (5 mg total) by mouth as needed for Migraine. May repeat dose up to 3 times in a day. 30 tablet 3

## 2025-07-22 RX ORDER — LISINOPRIL 20 MG/1
20 TABLET ORAL DAILY
Qty: 90 TABLET | Refills: 3 | Status: SHIPPED | OUTPATIENT
Start: 2025-07-22

## 2025-08-26 DIAGNOSIS — F41.9 ANXIETY: ICD-10-CM

## 2025-08-29 RX ORDER — ALPRAZOLAM 0.5 MG
TABLET ORAL
Qty: 30 TABLET | Refills: 5 | Status: SHIPPED | OUTPATIENT
Start: 2025-08-29

## (undated) DEVICE — 35 ML SYRINGE REGULAR TIP: Brand: MONOJECT

## (undated) DEVICE — LAP CHOLE: Brand: MEDLINE INDUSTRIES, INC.

## (undated) DEVICE — Device: Brand: DEFENDO AIR/WATER/SUCTION AND BIOPSY VALVE

## (undated) DEVICE — Device: Brand: CUSTOM PROCEDURE KIT

## (undated) DEVICE — TISSUE RETRIEVAL SYSTEM: Brand: INZII RETRIEVAL SYSTEM

## (undated) DEVICE — MEDI-VAC NON-CONDUCTIVE SUCTION TUBING 6MM X 1.8M (6FT.) L: Brand: CARDINAL HEALTH

## (undated) DEVICE — FORCEP RADIAL JAW 4

## (undated) DEVICE — ENDOPATH ETS45 2.5MM RELOADS (VASCULAR/THIN): Brand: ENDOPATH

## (undated) DEVICE — CONMED SCOPE SAVER BITE BLOCK, 20X27 MM: Brand: SCOPE SAVER

## (undated) DEVICE — TROCAR: Brand: KII FIOS FIRST ENTRY

## (undated) DEVICE — LINE MNTR ADLT SET O2 INTMD

## (undated) DEVICE — SOL LACT RINGERS 1000ML

## (undated) DEVICE — ENCORE® LATEX MICRO SIZE 8, STERILE LATEX POWDER-FREE SURGICAL GLOVE: Brand: ENCORE

## (undated) DEVICE — ENCORE® LATEX ACCLAIM SIZE 8, STERILE LATEX POWDER-FREE SURGICAL GLOVE: Brand: ENCORE

## (undated) DEVICE — TROCAR: Brand: KII® SLEEVE

## (undated) DEVICE — SUTURE VICRYL 0 J906G

## (undated) DEVICE — [HIGH FLOW INSUFFLATOR,  DO NOT USE IF PACKAGE IS DAMAGED,  KEEP DRY,  KEEP AWAY FROM SUNLIGHT,  PROTECT FROM HEAT AND RADIOACTIVE SOURCES.]: Brand: PNEUMOSURE

## (undated) DEVICE — DISPOSABLE LAPAROSCOPIC CLIP APPLIER WITH 20 CLIPS.: Brand: EPIX® UNIVERSAL CLIP APPLIER

## (undated) DEVICE — UNDYED BRAIDED (POLYGLACTIN 910), SYNTHETIC ABSORBABLE SUTURE: Brand: COATED VICRYL

## (undated) DEVICE — INSUFFLATION NEEDLE TO ESTABLISH PNEUMOPERITONEUM.: Brand: INSUFFLATION NEEDLE

## (undated) DEVICE — ENDOPATH ETS-FLEX45 ARTICULATING ENDOSCOPIC LINEAR CUTTER, NO RELOAD: Brand: ENDOPATH

## (undated) DEVICE — ETS45 RELOAD STANDARD 45MM: Brand: ENDOPATH

## (undated) DEVICE — SOL  .9 1000ML BTL

## (undated) NOTE — LETTER
10/19/2018              Dayanna Jack        [id number] [facility]         To Whom it may concern: This is to certify that Dayanna Jack had an appointment on 10/19/2018 at 2:39 PM with Daniel Reynolds DO.   Off work today a

## (undated) NOTE — LETTER
Date & Time: 6/18/2023, 11:13 AM  Patient: Zhang Nail  Encounter Provider(s):    MAC Dumont       To Whom It May Concern:    Katrina Metzger was seen and treated in our department on 6/18/2023. Please excuse from work on Monday and Tuesday.   If you have any questions or concerns, please do not hesitate to call.        _____________________________  Physician/APC Signature

## (undated) NOTE — LETTER
[facility]  [address] [place] [id number]  [phone]            [address] [facility] [address] [id number]  ?  11/17/21  ? Re: [place]  ?   To Whom It May Concern:    [place]

## (undated) NOTE — LETTER
2/21/2019          To Whom It May Concern:    Rell Goldsmith is currently under my medical care. Please excuse Mariella Felicity for being delayed at work today. She may return to work today following her appointment without restrictions.        If you

## (undated) NOTE — LETTER
Patient Name: Sandra Childress  : 1975  MRN: [id number]  Patient Address: [address] [place] [id number]      Coronavirus Disease 2019 (COVID-19)     [facility] is committed to the safety and well-being of our patients, me carefully. If your symptoms get worse, call your healthcare provider immediately. 3. Get rest and stay hydrated.    4. If you have a medical appointment, call the healthcare provider ahead of time and tell them that you have or may have COVID-19.  5. For m of fever-reducing medications; and  · Improvement in respiratory symptoms (e.g., cough, shortness of breath); and  · At least 10 days have passed since symptoms first appeared OR if asymptomatic patient or date of symptom onset is unclear then use 10 days donors must:    · Have had a confirmed diagnosis of COVID-19  · Be symptom-free for at least 14 days*    *Some people will be required to have a repeat COVID-19 test in order to be eligible to donate.  If you’re instructed by Stephenie that a repeat test is r random. Researchers are trying to identify similarities between people with a Post-COVID condition to better understand if there are risk factors. How do I prevent a Post-COVID condition?   The best way to prevent the long-term symptoms of COVID-19 is

## (undated) NOTE — LETTER
HOSPITALISTS  [facility] [place] [id number] [phone]            [address] [address]  ?  11/17/21  ? Re: Rosalba Malik  ?   To Whom It May Concern:    Rosalba Malik

## (undated) NOTE — LETTER
HOSPITALISTS  [facility] [id number]  [phone]            [address] [facility] [address] [facility] [address]  ?  11/17/21  ?  ?   To Whom It May Concern:      Mr Clara Jones should be excused from work from 11/

## (undated) NOTE — LETTER
04/08/19        Sonu Benjamin  [address] [facility]      Dear Lynsey Osorio,    [address] [facility] records indicate that you have outstanding lab work and or testing that was ordered for you and has not yet been completed:  Orders Placed This Encounter

## (undated) NOTE — LETTER
[facility]  Administration of Anesthesia  1.  Lizzy Blackman, or _________________________________ acting on her behalf, (Patient) (Dependent/Representative) request to receive anesthesia for my pending procedure/operation/treatme infections, high spinal block, spinal bleeding, seizure, cardiac arrest and death. 7. AWARENESS: I understand that it is possible (but unlikely) to have explicit memory of events from the operating room while under general anesthesia.   8. ELECTROCONVULSIV unconscious pt /Relationship    My signature below affirms that prior to the time of the procedure, I have explained to the patient and/or his/her guardian, the risks and benefits of undergoing anesthesia, as well as any reasonable alternatives.     _______

## (undated) NOTE — LETTER
3/16/2021              Virginia Macdonaldph        [facility] [address] [id number]         Dear Sherry Alanis,    [address] [facility] records indicate that the tests ordered for you by Beatriz Can, DO  have not been done.   If you have, in fact, already comp

## (undated) NOTE — LETTER
1/28/2019              Indio Nixon        [address] [facility]         Dear Caity Llanos,    I wanted to get back to you with your colonoscopy results.  You had one colon polyp removed which was benign.  I would advise a repeat c

## (undated) NOTE — LETTER
[address], [place]  Authorization for Invasive Procedures  1.  I hereby authorize Dr. Latesha Lawson** , my physician and whomever may be designated as the doctor's assistant, to perform the following operation and/or procedure:  ESOPHAG performed for the purposes of advancing medicine, science, and/or education, provided my identity is not revealed. If the procedure has been videotaped, the physician/surgeon will obtain the original videotape.  The hospital will not be responsible for stor My signature below affirms that prior to the time of the procedure, I have explained to the patient and/or her legal representative, the risks and benefits involved in the proposed treatment and any reasonable alternative to the proposed treatment.  I have

## (undated) NOTE — LETTER
6/4/2018      Dear José Miguel Chavez,  I had the pleasure of seeing your patient, Stew Li today,6/4/2018   .   SUBJECTIVE     Chief Complaint: Keaton Cardona is a 39year old  female here today for consultation for past history of • Menorrhagia     embx neg, cervical polyp 2013   • Migraines    • Pinched nerve     in back   • Vulval hematoma      x 2 (vulva hematoma x 1)      Past Surgical History:  : ENDOMETRIAL ABLATION      Comment: HTA  2016: HYSTERECTOMY      Comme Alcohol use Yes 0.0 oz/week   Comment: beer & wine, rarely        PHYSICAL EXAM   /85   Pulse 82   LMP 02/14/2016   General appearance: 48-year-old  woman, in the office with her .   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 2. 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      3. Stable vascular infundibulum involvin would recommend a CT angiogram utilizing the latest metal artifact reduction software which can significantly improve the images.     3. Given the fact that she lives in [address] [place] [address], we can perform her angiogram at [facility] which i

## (undated) NOTE — MR AVS SNAPSHOT
[facility] SPECIALTY [facility] [place] [address] [address]  [id number] [phone]               Thank you for choosing us for your health care visit with Kathy Puentes DO.   We are glad to serve you and happy to provide you with this summary of [facility])  [address], [address] [facility]  [address], [address] (MRI Only)  [address] [place] [address] [place], [place]    It is the patient's responsibility to Verapamil HCl  MG Tbcr   Take 1 tablet (240 mg total) by mouth once daily.    Commonly known as:  CALAN-SR                Where to Get Your Medications      These medications were sent to [address], [address] [place] [address] active are less likely to develop some chronic diseases than adults who are inactive.      HOW TO GET STARTED: HOW TO STAY MOTIVATED:   Start activities slowly and build up over time Do what you like   Get your heart pumping – brisk walking, biking, swimmin

## (undated) NOTE — LETTER
01/03/19        [place] Spikes  [address] [facility]      Dear Miesha Elmore,    [address] [facility] records indicate that you have outstanding lab work and or testing that was ordered for you and has not yet been completed:  Orders Placed This Encounter

## (undated) NOTE — LETTER
03/26/19        Daryl Jones  [address] [facility]      Dear Diamond Beltran,    [address] [facility] records indicate that you have outstanding lab work and or testing that was ordered for you and has not yet been completed:    US Gallbladder    To provid